# Patient Record
Sex: MALE | Race: WHITE
[De-identification: names, ages, dates, MRNs, and addresses within clinical notes are randomized per-mention and may not be internally consistent; named-entity substitution may affect disease eponyms.]

---

## 2017-02-13 ENCOUNTER — HOSPITAL ENCOUNTER (OUTPATIENT)
Dept: HOSPITAL 47 - BARWHC3 | Age: 57
Discharge: HOME | End: 2017-02-13
Attending: SURGERY
Payer: COMMERCIAL

## 2017-02-13 DIAGNOSIS — E66.01: ICD-10-CM

## 2017-02-13 DIAGNOSIS — Z01.818: Primary | ICD-10-CM

## 2017-02-13 DIAGNOSIS — Z99.89: ICD-10-CM

## 2017-02-13 DIAGNOSIS — Z87.891: ICD-10-CM

## 2017-02-13 DIAGNOSIS — G47.33: ICD-10-CM

## 2017-02-13 PROCEDURE — 99211 OFF/OP EST MAY X REQ PHY/QHP: CPT

## 2017-02-13 NOTE — P.HPBAR
Bariatric H&P





- History & Physicial


H&P Date: 02/13/17


History & Physicial: 


Visit/CC: 





Patient initial contact: 





Initial weight: 


Initial weight in pounds: 


Height: 


Initial BMI: 





Last weight: 





Current weight: 


Current weight in pounds: 


Current BMI: 





Ideal body weight (based on NIH guidelines): 





Excess body weight loss: 








The patient is a 56 year-old M who presents for Bariatric Assessment.  Patient 

presents for sleeve follow-up consultation.  He has recently undergone 

psychological evaluation and has obtained a letter from his primary care 

doctor.  The patient is upset regarding his psychologist.  Apparently Yonny Kolb has pain in his psychologist however he is demanding another $250 for his 

letter.














Past Medical History


Past Medical History: Eye Disorder, GERD/Reflux, Hypertension, Musculoskeletal 

Disorder, Osteoarthritis (OA), Rheumatoid Arthritis (RA), Sleep Apnea/CPAP/BIPAP


Additional Past Medical History / Comment(s): EDEMA TO RUFUS EXTREMITIES, 

CELLULITIS, WRAPS USED, ON RX. GLAUCOMA.  Stomach Ulcer caused infection to 

Liver, Kidneys 1/15/2016.  Hx Bowel obst-self resolved.  Chronic back pain due 

to stenosis.  USES CPAP.


History of Any Multi-Drug Resistant Organisms: None Reported


Past Surgical History: Hernia Repair, Tonsillectomy


Additional Past Surgical History / Comment(s): C6 Spinal Fusion.  Hiatal Hernia 

Repair.  Drainage of Liver Abcess.  EGD, COLONOSCOPY.


Past Anesthesia/Blood Transfusion Reactions: No Reported Reaction


Additional Past Anesthesia/Blood Transfusion Reaction / Comm: hx no blood 

transfusion


Past Psychological History: No Psychological Hx Reported


Smoking Status: Former smoker


Past Alcohol Use History: None Reported


Additional Past Alcohol Use History / Comment(s): Quit Smoking 2009, Started 

Smoking 1989, Smoked 1ppd


Past Drug Use History: None Reported





- Past Family History


  ** Mother


Family Medical History: No Reported History





  ** Father


Additional Family Medical History / Comment(s): heart problems-living at age 83





Surgical - Exam





- General


well developed, no distress





- Eyes


PERRL





- ENT


normal pinna





- Neck


no masses





- Respiratory


normal expansion





- Cardiovascular


Rhythm: regular





- Abdomen


Abdomen: soft, non tender





Bariatric Assessment & Plan


Plan: 





Morbid obesity.  Patient has a good understanding of sleeve gastrectomy.  MThe 

patient is aware of the risk of surgery.  The patient is a good understanding 

of the sleeve gastrectomy procedure.  Patient understands the risk of gastric 

staple line disruption, bleeding, perforation and obstruction.  The patient 

aware that in the event of staple line disruption or perforation a surgical 

drain will need to be placed and TPN will be used for nutrition.





The patient will be scheduled for sleeve gastrectomy hopefully next month.





Bariatric Checklist


Checklist: 


Plan: 





Checklist: 





EGD: 


1. Hiatal hernia: 


2. H. Pylori: 





HgbA1c: 





Vitamin D: 





Smoking: Former smoker





Primary care physician referral: Dr. Cid





Psychiatry clearance: 





Cardiology clearance: 





Sleep study: 





Diet journal: 





VTE risk score: 





VTE risk level: 





Rehab needs at discharge:

## 2017-02-20 ENCOUNTER — HOSPITAL ENCOUNTER (OUTPATIENT)
Dept: HOSPITAL 47 - BARWHC3 | Age: 57
Discharge: HOME | End: 2017-02-20
Attending: SURGERY
Payer: COMMERCIAL

## 2017-02-20 VITALS — BODY MASS INDEX: 44.7 KG/M2

## 2017-02-20 DIAGNOSIS — Z71.3: Primary | ICD-10-CM

## 2017-02-20 DIAGNOSIS — E66.01: ICD-10-CM

## 2017-02-20 PROCEDURE — 97804 MEDICAL NUTRITION GROUP: CPT

## 2017-03-24 ENCOUNTER — HOSPITAL ENCOUNTER (INPATIENT)
Dept: HOSPITAL 47 - 2ORMAIN | Age: 57
LOS: 3 days | Discharge: HOME | DRG: 620 | End: 2017-03-27
Attending: SURGERY | Admitting: SURGERY
Payer: COMMERCIAL

## 2017-03-24 VITALS — BODY MASS INDEX: 43.4 KG/M2

## 2017-03-24 DIAGNOSIS — G47.33: ICD-10-CM

## 2017-03-24 DIAGNOSIS — Z87.891: ICD-10-CM

## 2017-03-24 DIAGNOSIS — K56.7: ICD-10-CM

## 2017-03-24 DIAGNOSIS — Z79.899: ICD-10-CM

## 2017-03-24 DIAGNOSIS — E11.22: ICD-10-CM

## 2017-03-24 DIAGNOSIS — E83.42: ICD-10-CM

## 2017-03-24 DIAGNOSIS — E66.01: Primary | ICD-10-CM

## 2017-03-24 DIAGNOSIS — Z82.49: ICD-10-CM

## 2017-03-24 DIAGNOSIS — Z98.1: ICD-10-CM

## 2017-03-24 DIAGNOSIS — I12.9: ICD-10-CM

## 2017-03-24 DIAGNOSIS — M51.36: ICD-10-CM

## 2017-03-24 DIAGNOSIS — K66.0: ICD-10-CM

## 2017-03-24 DIAGNOSIS — N18.9: ICD-10-CM

## 2017-03-24 DIAGNOSIS — E78.5: ICD-10-CM

## 2017-03-24 DIAGNOSIS — M06.9: ICD-10-CM

## 2017-03-24 DIAGNOSIS — M19.90: ICD-10-CM

## 2017-03-24 DIAGNOSIS — K21.9: ICD-10-CM

## 2017-03-24 DIAGNOSIS — H40.9: ICD-10-CM

## 2017-03-24 DIAGNOSIS — G89.29: ICD-10-CM

## 2017-03-24 PROCEDURE — 94640 AIRWAY INHALATION TREATMENT: CPT

## 2017-03-24 PROCEDURE — 84520 ASSAY OF UREA NITROGEN: CPT

## 2017-03-24 PROCEDURE — 83735 ASSAY OF MAGNESIUM: CPT

## 2017-03-24 PROCEDURE — 0DB64Z3 EXCISION OF STOMACH, PERCUTANEOUS ENDOSCOPIC APPROACH, VERTICAL: ICD-10-PCS

## 2017-03-24 PROCEDURE — 85025 COMPLETE CBC W/AUTO DIFF WBC: CPT

## 2017-03-24 PROCEDURE — 88307 TISSUE EXAM BY PATHOLOGIST: CPT

## 2017-03-24 PROCEDURE — 74240 X-RAY XM UPR GI TRC 1CNTRST: CPT

## 2017-03-24 PROCEDURE — 82565 ASSAY OF CREATININE: CPT

## 2017-03-24 PROCEDURE — 80051 ELECTROLYTE PANEL: CPT

## 2017-03-24 PROCEDURE — 82310 ASSAY OF CALCIUM: CPT

## 2017-03-24 PROCEDURE — 84100 ASSAY OF PHOSPHORUS: CPT

## 2017-03-24 PROCEDURE — 0DNW4ZZ RELEASE PERITONEUM, PERCUTANEOUS ENDOSCOPIC APPROACH: ICD-10-PCS

## 2017-03-24 PROCEDURE — 92950 HEART/LUNG RESUSCITATION CPR: CPT

## 2017-03-24 PROCEDURE — 94760 N-INVAS EAR/PLS OXIMETRY 1: CPT

## 2017-03-24 RX ADMIN — ISODIUM CHLORIDE SCH: 0.03 SOLUTION RESPIRATORY (INHALATION) at 19:33

## 2017-03-24 RX ADMIN — KETOROLAC TROMETHAMINE SCH MG: 30 INJECTION, SOLUTION INTRAMUSCULAR at 20:16

## 2017-03-24 RX ADMIN — HYDROMORPHONE HYDROCHLORIDE PRN MG: 1 INJECTION, SOLUTION INTRAMUSCULAR; INTRAVENOUS; SUBCUTANEOUS at 17:53

## 2017-03-24 RX ADMIN — THIAMINE HYDROCHLORIDE SCH MLS/HR: 100 INJECTION, SOLUTION INTRAMUSCULAR; INTRAVENOUS at 19:18

## 2017-03-24 RX ADMIN — HYDROMORPHONE HYDROCHLORIDE PRN MG: 1 INJECTION, SOLUTION INTRAMUSCULAR; INTRAVENOUS; SUBCUTANEOUS at 21:05

## 2017-03-24 RX ADMIN — ONDANSETRON PRN MG: 2 INJECTION INTRAMUSCULAR; INTRAVENOUS at 19:53

## 2017-03-24 RX ADMIN — HYDROMORPHONE HYDROCHLORIDE PRN MG: 1 INJECTION, SOLUTION INTRAMUSCULAR; INTRAVENOUS; SUBCUTANEOUS at 17:58

## 2017-03-24 RX ADMIN — AMPICILLIN SODIUM AND SULBACTAM SODIUM SCH MLS/HR: 2; 1 INJECTION, POWDER, FOR SOLUTION INTRAMUSCULAR; INTRAVENOUS at 20:15

## 2017-03-24 RX ADMIN — HYDROMORPHONE HYDROCHLORIDE PRN MG: 1 INJECTION, SOLUTION INTRAMUSCULAR; INTRAVENOUS; SUBCUTANEOUS at 17:48

## 2017-03-24 RX ADMIN — POTASSIUM CHLORIDE SCH MLS: 14.9 INJECTION, SOLUTION INTRAVENOUS at 14:16

## 2017-03-24 RX ADMIN — HYDROMORPHONE HYDROCHLORIDE PRN MG: 1 INJECTION, SOLUTION INTRAMUSCULAR; INTRAVENOUS; SUBCUTANEOUS at 21:07

## 2017-03-24 NOTE — P.OP
Date of Procedure: 03/24/17


Preoperative Diagnosis: 


Morbid obesity





BMI 44


Postoperative Diagnosis: 


Morbid obesity





BMI 44


Procedure(s) Performed: 


Laparoscopic sleeve gastrectomy





Laparoscopic lysis of adhesions


Anesthesia: LETITIA


Surgeon: Kam Betancourt


Estimated Blood Loss (ml): 15


Pathology: other (Gastric sleeve)


Condition: stable


Disposition: PACU


Description of Procedure: 


The patient was placed on the operating room table in the supine position.  She 

received general anesthesia and then was placed in dorsal lithotomy position.  

Her abdomen was prepped and draped in sterile fashion.  The skin incision sites 

were anesthetized 1% local Xylocaine.  And then the skin was incised with an 11 

blade in the left lateral position.  Using a blade less trocar under direct 

visualization the peritoneal cavity was entered.  The abdomen was insufflated 

and then a 5 mm laparoscope was placed into the peritoneal cavity.  A 5 mm 

trocar was placed in the right epigastric, and right lateral position.  A 15 mm 

trocar was placed in the supra-umbilical position and another 5 mm trocar was 

placed in the left lateral position.  The left lateral lobe of the liver was 

retracted.  The stomach was visualized.  There were extensive adhesions between 

the antrum and stomach and liver.  This was thought to be due to the patient's 

previous gastric ulcer.  The adhesions were lysed using sharp dissection and 

the Harmonic scissors.  Approximately 20 minutes of operative time used to lyse 

adhesions.  The greater curvature of the stomach was then dissected using the 

Harmonic scissors.  The dissection occurred approximately 5 cm from the pylorus 

to the level of the left koby.  There was no hiatal hernia seen.  At this point 

a 40-[place] bougie dilator was placed the oropharynx and passed into the 

esophagus and into the stomach by the CRNA.  The sleeve gastrectomy was 

performed by using the powered echelon stapler with a seam guard buttress 

material.  Sequential firings of the stapler were performed.  The gastric 

remnant was then brought out through the 15 mm trocar site.  The dilator was 

withdrawn.  And a orogastric tube was replaced into the stomach.  The stomach 

was insufflated with 200 mL of methylene blue normal saline.  There was no 

evidence of extravasation.  The abdomen was irrigated there is no bleeding 

seen.  The Hakeem-Leanne device was used to close the 15 mm trocar with 0 

Vicryl.  Skin was closed with interrupted 3-0 Monocryl sutures once the 

trochars withdrawn.  Dermabond dressing was applied.  Patient was sent to 

recovery in stable condition.

## 2017-03-24 NOTE — P.GSHP
History of Present Illness


H&P Date: 03/24/17


Chief Complaint: Morbid obesity





This a 56-year-old male who presents today for laparoscopic sleeve gastrectomy.

  Patient has had lifetime problems obesity.  His BMI is 43.  The patient is 

aware of the risk of surgery including conversion to the open procedure and 

injury to the stomach liver or spleen.  He is also a risk of possible gastric 

staple line disruption, bleeding or scarring.





- Constitutional


Constitutional: Reports as per HPI





Past Medical History


Past Medical History: Eye Disorder, GERD/Reflux, Hypertension, Musculoskeletal 

Disorder, Osteoarthritis (OA), Rheumatoid Arthritis (RA), Sleep Apnea/CPAP/BIPAP


Additional Past Medical History / Comment(s): Frequent EDEMA TO RUFUS EXTREMITIES-

none currently, HX CELLULITIS,GLAUCOMA.  Stomach Ulcer caused infection to Liver

-ended up w/sepsis 2016, affected kidney function @ that time but fine now,  Hx 

Bowel obst-self resolved.  Chronic back pain due to stenosis.  USES CPAP.


History of Any Multi-Drug Resistant Organisms: None Reported


Past Surgical History: Hernia Repair, Tonsillectomy


Additional Past Surgical History / Comment(s): C6 Spinal Fusion.  Hiatal Hernia 

Repair.  Drainage of Liver Abcess.  EGD, COLONOSCOPY.


Past Anesthesia/Blood Transfusion Reactions: No Reported Reaction


Additional Past Anesthesia/Blood Transfusion Reaction / Comment(s): hx no blood 

transfusion


Past Psychological History: No Psychological Hx Reported


Smoking Status: Former smoker


Past Alcohol Use History: None Reported


Additional Past Alcohol Use History / Comment(s): Quit Smoking 2009, Started 

Smoking 1989, Smoked 1ppd


Past Drug Use History: None Reported





- Past Family History


  ** Mother


Family Medical History: No Reported History





  ** Father


Additional Family Medical History / Comment(s): heart problems-living at age 83





Medications and Allergies


 Home Medications











 Medication  Instructions  Recorded  Confirmed  Type


 


Metoprolol Tartrate [Lopressor] 50 mg PO BID 02/23/16 03/24/17 History


 


hydrALAZINE HCL [Apresoline] 25 mg PO BID 02/23/16 03/24/17 History


 


Cyclobenzaprine [Flexeril] 5 mg PO DAILY PRN 06/22/16 03/24/17 History


 


Gabapentin [Neurontin] 300 mg PO QID 06/22/16 03/24/17 History


 


Indomethacin [Indocin] 50 mg PO BID 06/22/16 03/24/17 History


 


Multivitamin [Men's Multi-Vitamin] 500 mg PO DAILY 06/22/16 03/24/17 History


 


Omeprazole 40 mg PO DAILY 06/22/16 03/24/17 History


 


Potassium Chloride [Klor-Con] 20 meq PO TID 06/22/16 03/24/17 History


 


Suvorexant [Belsomra] 20 mg PO HS 06/22/16 03/24/17 History


 


Bumetanide [Bumex] 1 mg PO BID 08/22/16 03/24/17 History


 


Magnesium Oxide [Magox 400] 500 mg PO DAILY 08/22/16 03/24/17 History


 


Allopurinol [Zyloprim] 300 mg PO DAILY 10/17/16 03/24/17 History


 


Metolazone [Zaroxolyn] 5 mg PO DAILY 10/17/16 03/24/17 History


 


Calcitriol [Rocaltrol] 0.25 mcg PO TID 03/20/17 03/24/17 History


 


Cranberry Fruit Concentrate 4,200 mg PO DAILY 03/20/17 03/24/17 History





[Cranberry]    


 


Docusate [Colace] 100 mg PO DAILY 03/20/17 03/24/17 History


 


HYDROcodone/APAP 10-325MG [Norco 1 tab PO Q4H PRN 03/20/17 03/24/17 History





]    


 


Modafinil [Provigil] 200 mg PO BID 03/20/17 03/24/17 History


 


Phenylephrine HCl [4 Way] 1 spray EA NOSTRIL DAILY PRN 03/20/17 03/24/17 History


 


Sodium Chloride [Ayr] 1 spray EA NOSTRIL DAILY 03/20/17 03/24/17 History











 Allergies











Allergy/AdvReac Type Severity Reaction Status Date / Time


 


No Known Allergies Allergy   Verified 03/24/17 13:45














Surgical - Exam


 Vital Signs











Temp Pulse Resp BP


 


 98.5 F   89   16   103/63 


 


 03/24/17 13:34  03/24/17 13:34  03/24/17 13:34  03/24/17 13:34














- General


well developed, no distress





- Eyes


PERRL





- ENT


normal pinna





- Neck


no masses





- Respiratory


normal expansion





- Cardiovascular


Rhythm: regular





- Abdomen


Abdomen: soft, non tender





Assessment and Plan


Plan: 





Morbid obesity.  Patient has a previous history of perforated gastric ulcer.  I 

discussed the patient's family that he is at risk of adhesions..  QUESTIONS 

have been answered.  Dr. Gonzalez be consult for medical management during his 

hospitalization.

## 2017-03-25 LAB
ANION GAP SERPL CALC-SCNC: 14 MMOL/L
BASOPHILS # BLD AUTO: 0 K/UL (ref 0–0.2)
BASOPHILS NFR BLD AUTO: 0 %
BUN SERPL-SCNC: 47 MG/DL (ref 9–20)
CALCIUM SPEC-MCNC: 9 MG/DL (ref 8.4–10.2)
CH: 31.6
CHCM: 34.9
CHLORIDE SERPL-SCNC: 97 MMOL/L (ref 98–107)
CO2 SERPL-SCNC: 30 MMOL/L (ref 22–30)
EOSINOPHIL # BLD AUTO: 0 K/UL (ref 0–0.7)
EOSINOPHIL NFR BLD AUTO: 0 %
ERYTHROCYTE [DISTWIDTH] IN BLOOD BY AUTOMATED COUNT: 4.96 M/UL (ref 4.3–5.9)
ERYTHROCYTE [DISTWIDTH] IN BLOOD: 14.3 % (ref 11.5–15.5)
HCT VFR BLD AUTO: 45.2 % (ref 39–53)
HDW: 3.17
HGB BLD-MCNC: 15.4 GM/DL (ref 13–17.5)
LUC NFR BLD AUTO: 3 %
LYMPHOCYTES # SPEC AUTO: 1.2 K/UL (ref 1–4.8)
LYMPHOCYTES NFR SPEC AUTO: 9 %
MAGNESIUM SPEC-SCNC: 2.4 MG/DL (ref 1.6–2.3)
MCH RBC QN AUTO: 31 PG (ref 25–35)
MCHC RBC AUTO-ENTMCNC: 34.1 G/DL (ref 31–37)
MCV RBC AUTO: 91 FL (ref 80–100)
MONOCYTES # BLD AUTO: 0.8 K/UL (ref 0–1)
MONOCYTES NFR BLD AUTO: 7 %
NEUTROPHILS # BLD AUTO: 9.9 K/UL (ref 1.3–7.7)
NEUTROPHILS NFR BLD AUTO: 80 %
NON-AFRICAN AMERICAN GFR(MDRD): >60
PHOSPHATE SERPL-MCNC: 4.5 MG/DL (ref 2.5–4.5)
POTASSIUM SERPL-SCNC: 3.8 MMOL/L (ref 3.5–5.1)
SODIUM SERPL-SCNC: 141 MMOL/L (ref 137–145)
WBC # BLD AUTO: 0.37 10*3/UL
WBC # BLD AUTO: 12.3 K/UL (ref 3.8–10.6)
WBC (PEROX): 12.44

## 2017-03-25 RX ADMIN — THIAMINE HYDROCHLORIDE SCH MLS/HR: 100 INJECTION, SOLUTION INTRAMUSCULAR; INTRAVENOUS at 00:12

## 2017-03-25 RX ADMIN — ONDANSETRON PRN MG: 2 INJECTION INTRAMUSCULAR; INTRAVENOUS at 00:20

## 2017-03-25 RX ADMIN — METOCLOPRAMIDE PRN MG: 5 INJECTION, SOLUTION INTRAMUSCULAR; INTRAVENOUS at 14:59

## 2017-03-25 RX ADMIN — KETOROLAC TROMETHAMINE SCH MG: 30 INJECTION, SOLUTION INTRAMUSCULAR at 05:19

## 2017-03-25 RX ADMIN — ISODIUM CHLORIDE SCH MG: 0.03 SOLUTION RESPIRATORY (INHALATION) at 08:31

## 2017-03-25 RX ADMIN — THIAMINE HYDROCHLORIDE SCH MLS/HR: 100 INJECTION, SOLUTION INTRAMUSCULAR; INTRAVENOUS at 20:53

## 2017-03-25 RX ADMIN — ENOXAPARIN SODIUM SCH MG: 40 INJECTION SUBCUTANEOUS at 20:54

## 2017-03-25 RX ADMIN — KETOROLAC TROMETHAMINE SCH MG: 30 INJECTION, SOLUTION INTRAMUSCULAR at 11:32

## 2017-03-25 RX ADMIN — MORPHINE SULFATE PRN MG: 2 INJECTION, SOLUTION INTRAMUSCULAR; INTRAVENOUS at 14:58

## 2017-03-25 RX ADMIN — ISODIUM CHLORIDE SCH MG: 0.03 SOLUTION RESPIRATORY (INHALATION) at 20:17

## 2017-03-25 RX ADMIN — ISODIUM CHLORIDE SCH MG: 0.03 SOLUTION RESPIRATORY (INHALATION) at 11:57

## 2017-03-25 RX ADMIN — ONDANSETRON PRN MG: 2 INJECTION INTRAMUSCULAR; INTRAVENOUS at 07:34

## 2017-03-25 RX ADMIN — AMPICILLIN SODIUM AND SULBACTAM SODIUM SCH MLS/HR: 2; 1 INJECTION, POWDER, FOR SOLUTION INTRAMUSCULAR; INTRAVENOUS at 00:12

## 2017-03-25 RX ADMIN — ONDANSETRON PRN MG: 2 INJECTION INTRAMUSCULAR; INTRAVENOUS at 14:59

## 2017-03-25 RX ADMIN — ISODIUM CHLORIDE SCH: 0.03 SOLUTION RESPIRATORY (INHALATION) at 16:51

## 2017-03-25 RX ADMIN — MORPHINE SULFATE PRN MG: 2 INJECTION, SOLUTION INTRAMUSCULAR; INTRAVENOUS at 22:05

## 2017-03-25 RX ADMIN — KETOROLAC TROMETHAMINE SCH MG: 30 INJECTION, SOLUTION INTRAMUSCULAR at 23:32

## 2017-03-25 RX ADMIN — HYDROMORPHONE HYDROCHLORIDE PRN MG: 1 INJECTION, SOLUTION INTRAMUSCULAR; INTRAVENOUS; SUBCUTANEOUS at 00:12

## 2017-03-25 RX ADMIN — THIAMINE HYDROCHLORIDE SCH MLS/HR: 100 INJECTION, SOLUTION INTRAMUSCULAR; INTRAVENOUS at 07:34

## 2017-03-25 RX ADMIN — POTASSIUM CHLORIDE SCH: 14.9 INJECTION, SOLUTION INTRAVENOUS at 05:29

## 2017-03-25 RX ADMIN — PANTOPRAZOLE SODIUM SCH MG: 40 INJECTION, POWDER, FOR SOLUTION INTRAVENOUS at 10:10

## 2017-03-25 RX ADMIN — KETOROLAC TROMETHAMINE SCH MG: 30 INJECTION, SOLUTION INTRAMUSCULAR at 00:11

## 2017-03-25 RX ADMIN — METOCLOPRAMIDE PRN MG: 5 INJECTION, SOLUTION INTRAMUSCULAR; INTRAVENOUS at 07:34

## 2017-03-25 RX ADMIN — KETOROLAC TROMETHAMINE SCH MG: 30 INJECTION, SOLUTION INTRAMUSCULAR at 18:26

## 2017-03-25 RX ADMIN — MORPHINE SULFATE PRN MG: 2 INJECTION, SOLUTION INTRAMUSCULAR; INTRAVENOUS at 19:13

## 2017-03-25 RX ADMIN — ENOXAPARIN SODIUM SCH MG: 40 INJECTION SUBCUTANEOUS at 10:10

## 2017-03-25 RX ADMIN — MORPHINE SULFATE PRN MG: 2 INJECTION, SOLUTION INTRAMUSCULAR; INTRAVENOUS at 11:33

## 2017-03-25 RX ADMIN — ONDANSETRON PRN MG: 2 INJECTION INTRAMUSCULAR; INTRAVENOUS at 19:13

## 2017-03-25 NOTE — FL
EXAMINATION TYPE: FL UGI

 

DATE OF EXAM ORDERED: 3/25/2017 9:53 AM

 

HISTORY: Post Op Bariatric Surgery.

 

COMPARISON: None.

 

FINDINGS:  There is a mild delay in aggressive of barium from the esophagus into the stomach. There i
s no evidence of extravasation. There is no evidence of free air. The ligament of Treitz is in the no
rmal location.

 

IMPRESSION: 

 

STATUS POST GASTRIC SLEEVE PROCEDURE.

## 2017-03-25 NOTE — P.PN
Progress Note - Text





Patient is postop day 1 posterior gastric sleeve procedure.  Obesity.  Has much 

nausea and the some dry heaves.  States he's not been able to keep any fluids 

down.  Did have a barium swallow this morning which showed the mild delay in 

passage of contrast the through the stomach the apposition or leak was noted.





On examination the patient is awake alert in no distress.  Temperature is 

normal.  Vitals are normal.  Abdomen is obese soft.  Usual mild postoperative 

tenderness at the trocar sites.  No guarding or rebound or evidence of 

peritonitis or complication.





Impression probably mild gastric ileus with the mild delay of contrast the





Recommendation Dr. Betancourt is aware.Continued medical management and further 

recommendation per Dr. Betancourt.

## 2017-03-26 RX ADMIN — POTASSIUM CHLORIDE SCH: 20 TABLET, EXTENDED RELEASE ORAL at 12:22

## 2017-03-26 RX ADMIN — METOPROLOL TARTRATE SCH MG: 50 TABLET, FILM COATED ORAL at 12:15

## 2017-03-26 RX ADMIN — MODAFINIL SCH: 200 TABLET ORAL at 12:19

## 2017-03-26 RX ADMIN — MORPHINE SULFATE PRN MG: 2 INJECTION, SOLUTION INTRAMUSCULAR; INTRAVENOUS at 20:42

## 2017-03-26 RX ADMIN — ISODIUM CHLORIDE SCH: 0.03 SOLUTION RESPIRATORY (INHALATION) at 16:48

## 2017-03-26 RX ADMIN — MODAFINIL SCH: 200 TABLET ORAL at 17:28

## 2017-03-26 RX ADMIN — POTASSIUM CHLORIDE SCH: 20 TABLET, EXTENDED RELEASE ORAL at 20:42

## 2017-03-26 RX ADMIN — MORPHINE SULFATE PRN MG: 2 INJECTION, SOLUTION INTRAMUSCULAR; INTRAVENOUS at 15:03

## 2017-03-26 RX ADMIN — THIAMINE HYDROCHLORIDE SCH MLS/HR: 100 INJECTION, SOLUTION INTRAMUSCULAR; INTRAVENOUS at 20:39

## 2017-03-26 RX ADMIN — CALCITRIOL CAPSULES 0.25 MCG SCH MCG: 0.25 CAPSULE ORAL at 17:27

## 2017-03-26 RX ADMIN — LIDOCAINE SCH PATCH: 50 PATCH TOPICAL at 12:48

## 2017-03-26 RX ADMIN — ENOXAPARIN SODIUM SCH MG: 40 INJECTION SUBCUTANEOUS at 12:16

## 2017-03-26 RX ADMIN — HYDROCODONE BITARTRATE AND ACETAMINOPHEN PRN EACH: 10; 325 TABLET ORAL at 17:27

## 2017-03-26 RX ADMIN — POTASSIUM CHLORIDE SCH: 20 TABLET, EXTENDED RELEASE ORAL at 17:27

## 2017-03-26 RX ADMIN — GABAPENTIN SCH MG: 300 CAPSULE ORAL at 17:28

## 2017-03-26 RX ADMIN — ISODIUM CHLORIDE SCH: 0.03 SOLUTION RESPIRATORY (INHALATION) at 19:34

## 2017-03-26 RX ADMIN — Medication SCH: at 12:21

## 2017-03-26 RX ADMIN — CALCITRIOL CAPSULES 0.25 MCG SCH: 0.25 CAPSULE ORAL at 12:21

## 2017-03-26 RX ADMIN — THIAMINE HYDROCHLORIDE SCH MLS/HR: 100 INJECTION, SOLUTION INTRAMUSCULAR; INTRAVENOUS at 09:12

## 2017-03-26 RX ADMIN — MORPHINE SULFATE PRN MG: 2 INJECTION, SOLUTION INTRAMUSCULAR; INTRAVENOUS at 11:10

## 2017-03-26 RX ADMIN — THERA TABS SCH: TAB at 12:20

## 2017-03-26 RX ADMIN — CALCITRIOL CAPSULES 0.25 MCG SCH MCG: 0.25 CAPSULE ORAL at 20:41

## 2017-03-26 RX ADMIN — BUMETANIDE SCH MG: 1 TABLET ORAL at 12:18

## 2017-03-26 RX ADMIN — KETOROLAC TROMETHAMINE SCH MG: 30 INJECTION, SOLUTION INTRAMUSCULAR at 04:54

## 2017-03-26 RX ADMIN — GABAPENTIN SCH: 300 CAPSULE ORAL at 12:30

## 2017-03-26 RX ADMIN — GABAPENTIN SCH MG: 300 CAPSULE ORAL at 20:41

## 2017-03-26 RX ADMIN — ISODIUM CHLORIDE SCH MG: 0.03 SOLUTION RESPIRATORY (INHALATION) at 11:29

## 2017-03-26 RX ADMIN — BUMETANIDE SCH MG: 1 TABLET ORAL at 20:41

## 2017-03-26 RX ADMIN — ENOXAPARIN SODIUM SCH MG: 40 INJECTION SUBCUTANEOUS at 20:41

## 2017-03-26 RX ADMIN — THIAMINE HYDROCHLORIDE SCH MLS/HR: 100 INJECTION, SOLUTION INTRAMUSCULAR; INTRAVENOUS at 04:53

## 2017-03-26 RX ADMIN — METOPROLOL TARTRATE SCH MG: 50 TABLET, FILM COATED ORAL at 20:41

## 2017-03-26 RX ADMIN — PANTOPRAZOLE SODIUM SCH MG: 40 INJECTION, POWDER, FOR SOLUTION INTRAVENOUS at 08:17

## 2017-03-26 RX ADMIN — METOLAZONE SCH: 5 TABLET ORAL at 12:21

## 2017-03-26 RX ADMIN — ALLOPURINOL SCH MG: 300 TABLET ORAL at 12:19

## 2017-03-26 RX ADMIN — MORPHINE SULFATE PRN MG: 2 INJECTION, SOLUTION INTRAMUSCULAR; INTRAVENOUS at 07:54

## 2017-03-26 RX ADMIN — MORPHINE SULFATE PRN MG: 2 INJECTION, SOLUTION INTRAMUSCULAR; INTRAVENOUS at 18:07

## 2017-03-26 RX ADMIN — ISODIUM CHLORIDE SCH MG: 0.03 SOLUTION RESPIRATORY (INHALATION) at 07:22

## 2017-03-26 RX ADMIN — MORPHINE SULFATE PRN MG: 2 INJECTION, SOLUTION INTRAMUSCULAR; INTRAVENOUS at 04:53

## 2017-03-26 RX ADMIN — MORPHINE SULFATE PRN MG: 2 INJECTION, SOLUTION INTRAMUSCULAR; INTRAVENOUS at 01:36

## 2017-03-26 RX ADMIN — GABAPENTIN SCH: 300 CAPSULE ORAL at 12:21

## 2017-03-26 RX ADMIN — LIDOCAINE SCH PATCH: 50 PATCH TOPICAL at 13:04

## 2017-03-26 NOTE — PN
SUBJECTIVE:  A 56-year-old white male, status post gastric sleeve, 

complaining he is shaking due to severe pain. He states I am not 

giving him his home pain medicines. He is in severe pain, it is not 

working when he is getting.  



History of lumbar disc disease. Discussed with the nurse or inpatient, 

ordering a Lidoderm patch for his lower back, increasing his morphine 

and putting him on his home pain medications, which will be done.  



CARDIOVASCULAR: S1, S2. 

LUNGS: Clear. 

GI: Incision clean, dry, intact. 



Pulse rate is in the 80s to 90s, respirations 16 to 18, O2 at 100% on 

room air.  



ASSESSMENT: 

1. Status post gastric sleeve. 

2. Hypertension. 

3. History of liver abscess. 

4. Severe lumbar disc disease.

5. Hypomagnesemia. 

6. Chronic renal insufficiency. Continue with current treatment.

## 2017-03-26 NOTE — CONS
DATE OF CONSULTATION:   



CHIEF COMPLAINT: A 56-year-old white male, status post gastric sleeve 

surgery. He is complaining of severe lower back pain, has a history of 

severe lumbar disc disease. He has a lot of gas in his chest, status 

post laparoscopic gastric sleeve. His BMI was 43.  In the past year he 

has had a liver abscess, diabetes, hypertension, obesity, 

osteoarthritis, rheumatoid arthritis, severe obstructive sleep apnea. 

He has frequent edema to the lower extremities.  



History of cellulitis or glaucoma. Renal function, lumbar disc 

disease, uses CPAP, hernia repair, tonsillectomy, EGD, colonoscopy, C6 

cervical spinal fusions, colonoscopies.  



SOCIAL HISTORY: Former smoker. No alcohol or smoking since 2009. 



FAMILY HISTORY: Father had heart disease. 



MEDICATIONS:

1. Lopressor.

2. Apresoline.

3. Flexeril.

4. Neurontin.

5. Indocin. 

6. Omeprazole.

7. Klor-Con. 

8. Belsomra. 

9. Bumex.

10. Mag-Oxide.

11. Zyloprim.

12. Zaroxolyn. 

13. Glucotrol.

14. Colace.

15. Norco. 

16. Provigil. 



Allergies are negative. 



Temp 98.5, pulse 89, respiratory rate 16 to 18, blood pressure 103/63. 

CARDIOVASCULAR: S1, S2. 

LUNGS: Transmitted upper airway sounds. 

PSYCH: Fair mood and affect. 

NEUROLOGIC: Alert and oriented x3. 

GI: Soft, nontender, normal bowel sounds, morbid obesity. 

HEMATOLOGIC: Negative Homans. 

VASCULAR: Normal dorsalis pedis, posterior tibial and radial pulse. 

OPHTHALMOLOGIC: Pupils equal, round and reactive to light and 

accommodation. 



ASSESSMENT:

1. Status post gastric sleeve.

2. Diabetes mellitus.

3. Hypertension.

4. Sleep apnea. 

5. Obesity. 

6. Dyslipidemia. 



Continue current treatments for the next 24 to 48 hours. Home 

medicines will be reordered. Morphine will be given, Dilaudid will be 

discontinued as Dilaudid causes severe nausea. Zofran will be given 

for nausea.

## 2017-03-26 NOTE — P.PN
Progress Note - Text





The patient is very unhappy.  He complained mostly of back pain.  Had been on 

no quite home fluid.  Currently getting multiple pain medications including 

Dilaudid and local the it does not seem to be a covering his pain.  He is 

tolerating his liquids now with no further vomiting.  On examination the 

patient is awake alert afebrile vitals are stable.  Abdomen usual postoperative 

tenderness but quite soft.  Trocar sites are fine.





Impression improved flow GI function status post gastric sleeve procedure.  

Chronic back pain worse with the postop surgery.





Recommendation try tramadol for a few doses today ,suspect he should be able to 

be discharged tomorrow.

## 2017-03-27 VITALS
RESPIRATION RATE: 16 BRPM | HEART RATE: 86 BPM | DIASTOLIC BLOOD PRESSURE: 73 MMHG | SYSTOLIC BLOOD PRESSURE: 122 MMHG | TEMPERATURE: 99.2 F

## 2017-03-27 RX ADMIN — ISODIUM CHLORIDE SCH: 0.03 SOLUTION RESPIRATORY (INHALATION) at 07:30

## 2017-03-27 RX ADMIN — HYDROCODONE BITARTRATE AND ACETAMINOPHEN PRN EACH: 10; 325 TABLET ORAL at 00:07

## 2017-03-27 RX ADMIN — GABAPENTIN SCH MG: 300 CAPSULE ORAL at 07:55

## 2017-03-27 RX ADMIN — THERA TABS SCH: TAB at 07:59

## 2017-03-27 RX ADMIN — POTASSIUM CHLORIDE SCH: 20 TABLET, EXTENDED RELEASE ORAL at 08:00

## 2017-03-27 RX ADMIN — Medication SCH: at 07:58

## 2017-03-27 RX ADMIN — LIDOCAINE SCH PATCH: 50 PATCH TOPICAL at 07:59

## 2017-03-27 RX ADMIN — CALCITRIOL CAPSULES 0.25 MCG SCH MCG: 0.25 CAPSULE ORAL at 07:55

## 2017-03-27 RX ADMIN — GABAPENTIN SCH: 300 CAPSULE ORAL at 12:21

## 2017-03-27 RX ADMIN — THIAMINE HYDROCHLORIDE SCH MLS/HR: 100 INJECTION, SOLUTION INTRAMUSCULAR; INTRAVENOUS at 02:54

## 2017-03-27 RX ADMIN — THIAMINE HYDROCHLORIDE SCH: 100 INJECTION, SOLUTION INTRAMUSCULAR; INTRAVENOUS at 02:01

## 2017-03-27 RX ADMIN — THIAMINE HYDROCHLORIDE SCH MLS/HR: 100 INJECTION, SOLUTION INTRAMUSCULAR; INTRAVENOUS at 08:18

## 2017-03-27 RX ADMIN — LIDOCAINE SCH PATCH: 50 PATCH TOPICAL at 07:56

## 2017-03-27 RX ADMIN — ALLOPURINOL SCH MG: 300 TABLET ORAL at 07:56

## 2017-03-27 RX ADMIN — MORPHINE SULFATE PRN MG: 2 INJECTION, SOLUTION INTRAMUSCULAR; INTRAVENOUS at 06:07

## 2017-03-27 RX ADMIN — LIDOCAINE SCH PATCH: 50 PATCH TOPICAL at 07:57

## 2017-03-27 RX ADMIN — MORPHINE SULFATE PRN MG: 2 INJECTION, SOLUTION INTRAMUSCULAR; INTRAVENOUS at 02:52

## 2017-03-27 RX ADMIN — BUMETANIDE SCH MG: 1 TABLET ORAL at 07:55

## 2017-03-27 RX ADMIN — ENOXAPARIN SODIUM SCH: 40 INJECTION SUBCUTANEOUS at 07:50

## 2017-03-27 RX ADMIN — MORPHINE SULFATE PRN MG: 2 INJECTION, SOLUTION INTRAMUSCULAR; INTRAVENOUS at 00:08

## 2017-03-27 RX ADMIN — ISODIUM CHLORIDE SCH MG: 0.03 SOLUTION RESPIRATORY (INHALATION) at 07:26

## 2017-03-27 RX ADMIN — MORPHINE SULFATE PRN MG: 2 INJECTION, SOLUTION INTRAMUSCULAR; INTRAVENOUS at 09:19

## 2017-03-27 RX ADMIN — METOLAZONE SCH MG: 5 TABLET ORAL at 07:58

## 2017-03-27 RX ADMIN — METOPROLOL TARTRATE SCH MG: 50 TABLET, FILM COATED ORAL at 07:55

## 2017-03-27 RX ADMIN — ISODIUM CHLORIDE SCH: 0.03 SOLUTION RESPIRATORY (INHALATION) at 10:59

## 2017-03-27 RX ADMIN — MODAFINIL SCH: 200 TABLET ORAL at 07:54

## 2017-03-27 RX ADMIN — PANTOPRAZOLE SODIUM SCH MG: 40 INJECTION, POWDER, FOR SOLUTION INTRAVENOUS at 07:59

## 2017-03-27 RX ADMIN — HYDROCODONE BITARTRATE AND ACETAMINOPHEN PRN EACH: 10; 325 TABLET ORAL at 06:34

## 2017-03-27 NOTE — P.PN
Subjective





56-year-old being seen by medicine service at the request of the attending for 

medical management in a gentleman who is postop sleeve gastrectomy done on 

March 24 an elective basis for weight loss patient is seen sitting up in a 

chair and states he's anxious to be discharged home.  Dietitian to instruct 

patient on a bariatric diet.  Patient's denying dizziness lightheadedness chest 

pain or shortness of breath when questioning noted a low-grade temp of 99.2 

this morning no  labs pending





Objective





- Vital Signs


Vital signs: 


 Vital Signs











Temp  99.2 F   03/27/17 08:03


 


Pulse  86   03/27/17 08:03


 


Resp  16   03/27/17 08:03


 


BP  122/73   03/27/17 08:03


 


Pulse Ox  98   03/27/17 08:03








 Intake & Output











 03/26/17 03/27/17 03/27/17





 18:59 06:59 18:59


 


Intake Total 430 600 200


 


Output Total  850 


 


Balance 430 -250 200


 


Intake:   


 


  Intake, IV Titration  600 





  Amount   


 


    0.9% NaCl with KCl 20 Meq  600 





    /l 1,000 ml @ 150 mls/hr   





    IV .Q6H40M [facility] Rx#:   





    [id number]   


 


  Oral 430  200


 


Output:   


 


  Urine  850 


 


Other:   


 


  # Voids 3 2 














- Exam





Physical exam


56-year-old gentleman sitting up in a chair pleasant cooperative oriented 3


Lungs essentially clear adequate air movement


Heart S1-S2 audible and regular


Abdomen obese soft not distended surgical sites benign bowel tones present 

states urinating no difficulty in tolerating the diet


Extremities no edema noted





- Labs


CBC & Chem 7: 


 03/25/17 07:11





 03/25/17 07:11





Assessment and Plan


Plan: 





Impression


Status post laparoscopic sleeve gastrectomy for morbid obesity done on March 24


Severe lumbar disc disease


Morbid obesity BMI 43


Essential hypertension








Plan


From a medical perspective patient is felt to be medically stable and 

appropriate proceed with a discharge to home defer to the timing of the 

discharge to surgical service


Resume home meds as appropriate


Follow-up in the outpatient setting with Dr. Becker as directed


Pain control











The above dictated assessment and findings were discussed with dr askew .  

Impression and the plan of care have been dictated as directed.  Tanya Dockery 

nurse practitioner acting as a scribe for dr becker

## 2017-03-27 NOTE — P.DS
Providers


Date of admission: 


03/24/17 12:35





Expected date of discharge: 03/27/17


Attending physician: 


Kma Betancourt





Consults: 





 





03/24/17 17:16


Consult Physician Routine 


   Consulting Provider: Tom Cid Reason/Comments: Medical management


   Do you want consulting provider notified?: Yes











[facility] care [facility]: 


[facility]





[facility] Course: 


Patient is a 56-year-old white male with medical history significant for morbid 

obesity presenting to the hospital for elective laparoscopic sleeve 

gastrectomy.  Patient tolerated procedure well.  Upper GI post procedure with 

no evidence of leak or obstruction.  Post operatively, patient did have some 

acute on chronic back pain which improved during his hospital stay.  Otherwise, 

patient had an uneventful postop recovery and was felt stable for discharge to 

home with follow-up in the outpatient setting.





Discharge diagnoses:





1.  Morbid obesity status post laparoscopic sleeve gastrectomy


2.  Acute on chronic chronic lumbar disc disease.





The above impression and plan have been discussed and directed by Dr. Betancourt. 

Abisai JACOBSEN acting as scribe for Dr. Ko





Pertinent Studies: 


Upper GI series





Procedures: 


Laparoscopic sleeve gastrectomy; laparoscopic lysis of adhesions





Patient Condition at Discharge: Good





Plan - Discharge Summary


New Discharge Prescriptions: 


Ondansetron Odt [Zofran ODT] 8 mg PO Q8HR #20 tab


Sucralfate [Carafate] 1 gm PO ACHS #90 tablet


Discharge Medication List





Metoprolol Tartrate [Lopressor] 50 mg PO BID 02/23/16 [History]


hydrALAZINE HCL [Apresoline] 25 mg PO BID 02/23/16 [History]


Cyclobenzaprine [Flexeril] 5 mg PO DAILY PRN 06/22/16 [History]


Gabapentin [Neurontin] 300 mg PO QID 06/22/16 [History]


Indomethacin [Indocin] 50 mg PO BID 06/22/16 [History]


Multivitamin [Men's Multi-Vitamin] 1 tab PO DAILY 06/22/16 [History]


Omeprazole 40 mg PO DAILY 06/22/16 [History]


Potassium Chloride [Klor-Con] 20 meq PO TID 06/22/16 [History]


Suvorexant [Belsomra] 20 mg PO HS 06/22/16 [History]


Bumetanide [BUMEX] 1 mg PO BID 08/22/16 [History]


Allopurinol [Zyloprim] 300 mg PO DAILY 10/17/16 [History]


Metolazone [Zaroxolyn] 5 mg PO DAILY 10/17/16 [History]


Calcitriol [Rocaltrol] 0.25 mcg PO TID 03/20/17 [History]


Docusate [Colace] 100 mg PO DAILY 03/20/17 [History]


HYDROcodone/APAP 10-325MG [Norco ] 1 tab PO Q4H PRN 03/20/17 [History]


Modafinil [Provigil] 200 mg PO BID 03/20/17 [History]


Phenylephrine HCl [4 Way] 1 spray EA NOSTRIL DAILY PRN 03/20/17 [History]


Sodium Chloride [Ayr] 1 spray EA NOSTRIL DAILY 03/20/17 [History]


Cranberry   4,200 mg PO DAILY 03/24/17 [History]


Magnesium Oxide [Mag-Ox] 500 mg PO DAILY 03/24/17 [History]


Albuterol Nebulized [Ventolin Nebulized] 2.5 mg INHALATION RT-QID  nebu 03/27/ 17 [Rx]


Ondansetron Odt [Zofran ODT] 8 mg PO Q8HR #20 tab 03/27/17 [Rx]


Sucralfate [Carafate] 1 gm PO ACHS #90 tablet 03/27/17 [Rx]








Follow up Appointment(s)/Referral(s): 


Tom Cid MD [Primary Care Provider] - 04/03/17 10:15 am


Kam Betancourt MD [STAFF PHYSICIAN] - 04/10/17 2:20 pm (Bariatric center [phone] located at the [facility])


Patient Instructions/Handouts:  Laparoscopic Sleeve Gastrectomy (DC)


Activity/Diet/Wound Care/Special Instructions: 


No heavy lifting, pushing, or pulling items greater than 10 pounds. 


Bariatric diet as previously directed.  No caffeinated beverages or straws.


Shower daily,  no soaking in bath tubs, pools, or hot tubs.  


No driving while taking pain medication.


Notify surgeon with any signs or symptoms of infection, increased pain, or not 

tolerating diet. 





Discharge Disposition: HOME SELF-CARE

## 2017-04-10 ENCOUNTER — HOSPITAL ENCOUNTER (OUTPATIENT)
Dept: HOSPITAL 47 - BARWHC3 | Age: 57
Discharge: HOME | End: 2017-04-10
Attending: SURGERY
Payer: COMMERCIAL

## 2017-04-10 VITALS — HEART RATE: 81 BPM | SYSTOLIC BLOOD PRESSURE: 143 MMHG | DIASTOLIC BLOOD PRESSURE: 85 MMHG | TEMPERATURE: 98.6 F

## 2017-04-10 VITALS — BODY MASS INDEX: 38.2 KG/M2

## 2017-04-10 DIAGNOSIS — Z98.84: ICD-10-CM

## 2017-04-10 DIAGNOSIS — E66.01: ICD-10-CM

## 2017-04-10 DIAGNOSIS — Z48.815: Primary | ICD-10-CM

## 2017-04-10 DIAGNOSIS — Z87.891: ICD-10-CM

## 2017-04-10 PROCEDURE — 99211 OFF/OP EST MAY X REQ PHY/QHP: CPT

## 2017-04-10 PROCEDURE — 97803 MED NUTRITION INDIV SUBSEQ: CPT

## 2017-04-10 NOTE — P.HPBAR
Bariatric H&P





- History & Physicial


H&P Date: 04/10/17


History & Physicial: 


Visit/CC: two week follow up visit





Patient initial contact: 





Initial weight: 


Initial weight in pounds: 


Height: 5 ft 10 in


Initial BMI: 





Last weight: 





Current weight: 120.837 kg


Current weight in pounds: 266.40


Current BMI: 38.2





Ideal body weight (based on NIH guidelines): 75.296 kg





Excess body weight loss: 








The patient is a 56 year-old M who presents for Bariatric Assessment.  Patient 

presents today for sleeve gastrectomy follow-up.  He is 2 weeks postoperative.  

He has complaints of back pain.














Past Medical History


Past Medical History: Eye Disorder, GERD/Reflux, Hypertension, Musculoskeletal 

Disorder, Osteoarthritis (OA), Rheumatoid Arthritis (RA), Sleep Apnea/CPAP/BIPAP


Additional Past Medical History / Comment(s): Frequent EDEMA TO RUFUS EXTREMITIES-

none currently, HX CELLULITIS,GLAUCOMA.  Stomach Ulcer caused infection to Liver

-ended up w/sepsis 2016, affected kidney function @ that time but fine now,  Hx 

Bowel obst-self resolved.  Chronic back pain due to stenosis.  USES CPAP.


History of Any Multi-Drug Resistant Organisms: None Reported


Past Surgical History: Hernia Repair, Tonsillectomy


Additional Past Surgical History / Comment(s): C6 Spinal Fusion.  Hiatal Hernia 

Repair.  Drainage of Liver Abcess.  EGD, COLONOSCOPY.


Past Anesthesia/Blood Transfusion Reactions: No Reported Reaction


Additional Past Anesthesia/Blood Transfusion Reaction / Comm: hx no blood 

transfusion


Past Psychological History: No Psychological Hx Reported


Smoking Status: Former smoker


Past Alcohol Use History: None Reported


Additional Past Alcohol Use History / Comment(s): Quit Smoking 2009, Started 

Smoking 1989, Smoked 1ppd


Past Drug Use History: None Reported





- Past Family History


  ** Mother


Family Medical History: No Reported History





  ** Father


Additional Family Medical History / Comment(s): heart problems-living at age 83





Surgical - Exam


 Vital Signs











Temp Pulse BP


 


 98.6 F   81   143/85 


 


 04/10/17 08:39  04/10/17 08:39  04/10/17 08:39














- General


well developed, no distress





- Eyes


PERRL





- ENT


normal pinna





- Neck


no masses





- Respiratory


normal expansion





- Cardiovascular


Rhythm: regular





- Abdomen


Abdomen: soft, non tender





Bariatric Assessment & Plan


Plan: 





Status post sleeve gastrectomy.  Patient is doing quite well.  He will follow-

up with Dr. Gonzalez regarding his back pain.





Bariatric Checklist


Checklist: 


Plan: 





Checklist: 





EGD: 


1. Hiatal hernia: 


2. H. Pylori: 





HgbA1c: 





Vitamin D: 





Smoking: Former smoker





Primary care physician referral: Dr. Cid





Psychiatry clearance: 





Cardiology clearance: 





Sleep study: 





Diet journal: 





VTE risk score: 





VTE risk level: 





Rehab needs at discharge:

## 2017-04-24 ENCOUNTER — HOSPITAL ENCOUNTER (OUTPATIENT)
Dept: HOSPITAL 47 - BARWHC3 | Age: 57
End: 2017-04-24
Attending: SURGERY
Payer: COMMERCIAL

## 2017-04-24 VITALS — BODY MASS INDEX: 37.3 KG/M2

## 2017-04-24 VITALS
SYSTOLIC BLOOD PRESSURE: 115 MMHG | RESPIRATION RATE: 14 BRPM | DIASTOLIC BLOOD PRESSURE: 78 MMHG | HEART RATE: 80 BPM | TEMPERATURE: 97.7 F

## 2017-04-24 DIAGNOSIS — Z98.84: ICD-10-CM

## 2017-04-24 DIAGNOSIS — K21.9: Primary | ICD-10-CM

## 2017-04-24 DIAGNOSIS — Z87.891: ICD-10-CM

## 2017-04-24 PROCEDURE — 97803 MED NUTRITION INDIV SUBSEQ: CPT

## 2017-04-24 PROCEDURE — 99211 OFF/OP EST MAY X REQ PHY/QHP: CPT

## 2017-04-24 NOTE — P.HPBAR
Bariatric H&P





- History & Physicial


H&P Date: 04/24/17


History & Physicial: 


Visit/CC: 





Patient initial contact: 





Initial weight: 


Initial weight in pounds: 


Height: 


Initial BMI: 





Last weight: 





Current weight: 


Current weight in pounds: 


Current BMI: 





Ideal body weight (based on NIH guidelines): 





Excess body weight loss: 








The patient is a 56 year-old M who presents for Bariatric Assessment.  The 

patient presents today for sleeve gastrectomy follow-up.  He is doing quite 

well.  He has lost 14 pounds since his last visit.  He has some mild GERD 

symptoms.  His back arthritis and leg arthritis is stable.














Past Medical History


Past Medical History: Eye Disorder, GERD/Reflux, Hypertension, Musculoskeletal 

Disorder, Osteoarthritis (OA), Rheumatoid Arthritis (RA), Sleep Apnea/CPAP/BIPAP


Additional Past Medical History / Comment(s): Frequent EDEMA TO RUFUS EXTREMITIES-

none currently, HX CELLULITIS,GLAUCOMA.  Stomach Ulcer caused infection to Liver

-ended up w/sepsis 2016, affected kidney function @ that time but fine now,  Hx 

Bowel obst-self resolved.  Chronic back pain due to stenosis.  USES CPAP.


History of Any Multi-Drug Resistant Organisms: None Reported


Past Surgical History: Bariatric Surgery, Hernia Repair, Tonsillectomy


Additional Past Surgical History / Comment(s): C6 Spinal Fusion.  Hiatal Hernia 

Repair.  Drainage of Liver Abcess.  EGD, COLONOSCOPY.sleeve gastrectomy 3-24-17


Past Anesthesia/Blood Transfusion Reactions: No Reported Reaction


Additional Past Anesthesia/Blood Transfusion Reaction / Comm: hx no blood 

transfusion


Past Psychological History: No Psychological Hx Reported


Smoking Status: Former smoker


Past Alcohol Use History: None Reported


Additional Past Alcohol Use History / Comment(s): Quit Smoking 2009, Started 

Smoking 1989, Smoked 1ppd


Past Drug Use History: None Reported





- Past Family History


  ** Mother


Family Medical History: No Reported History





  ** Father


Additional Family Medical History / Comment(s): heart problems-living at age 83





Surgical - Exam





- General


well developed, no distress





- Eyes


PERRL





- ENT


normal pinna





- Neck


no masses





- Respiratory


normal expansion





- Cardiovascular


Rhythm: regular





- Abdomen


Abdomen: soft, non tender





Bariatric Assessment & Plan


Plan: 





Status post sleeve gastrectomy.  Patient is doing quite well.  His GERD and 

arthritis symptoms are stable.  He'll follow-up in one month..





Bariatric Checklist


Checklist: 


Plan: 





Checklist: 





EGD: 


1. Hiatal hernia: 


2. H. Pylori: 





HgbA1c: 





Vitamin D: 





Smoking: Former smoker





Primary care physician referral: Dr. Cid





Psychiatry clearance: 





Cardiology clearance: 





Sleep study: 





Diet journal: 





VTE risk score: 





VTE risk level: 





Rehab needs at discharge:

## 2017-07-10 ENCOUNTER — HOSPITAL ENCOUNTER (OUTPATIENT)
Dept: HOSPITAL 47 - BARWHC3 | Age: 57
Discharge: HOME | End: 2017-07-10
Attending: SURGERY
Payer: COMMERCIAL

## 2017-07-10 ENCOUNTER — HOSPITAL ENCOUNTER (OUTPATIENT)
Dept: HOSPITAL 47 - LABWHC1 | Age: 57
Discharge: HOME | End: 2017-07-10
Payer: COMMERCIAL

## 2017-07-10 VITALS — SYSTOLIC BLOOD PRESSURE: 149 MMHG | TEMPERATURE: 97.7 F | DIASTOLIC BLOOD PRESSURE: 94 MMHG | HEART RATE: 82 BPM

## 2017-07-10 VITALS — BODY MASS INDEX: 35 KG/M2

## 2017-07-10 DIAGNOSIS — Z98.84: ICD-10-CM

## 2017-07-10 DIAGNOSIS — I10: ICD-10-CM

## 2017-07-10 DIAGNOSIS — Z09: Primary | ICD-10-CM

## 2017-07-10 DIAGNOSIS — Z90.49: ICD-10-CM

## 2017-07-10 DIAGNOSIS — Z87.891: ICD-10-CM

## 2017-07-10 DIAGNOSIS — R13.10: ICD-10-CM

## 2017-07-10 DIAGNOSIS — M19.90: ICD-10-CM

## 2017-07-10 DIAGNOSIS — N18.3: Primary | ICD-10-CM

## 2017-07-10 LAB
ANION GAP SERPL CALC-SCNC: 14 MMOL/L
BUN SERPL-SCNC: 14 MG/DL (ref 9–20)
CALCIUM SPEC-MCNC: 9.9 MG/DL (ref 8.4–10.2)
CHLORIDE SERPL-SCNC: 102 MMOL/L (ref 98–107)
CO2 SERPL-SCNC: 29 MMOL/L (ref 22–30)
GLUCOSE SERPL-MCNC: 86 MG/DL (ref 74–99)
NON-AFRICAN AMERICAN GFR(MDRD): >60
POTASSIUM SERPL-SCNC: 3.8 MMOL/L (ref 3.5–5.1)
SODIUM SERPL-SCNC: 145 MMOL/L (ref 137–145)

## 2017-07-10 PROCEDURE — 99211 OFF/OP EST MAY X REQ PHY/QHP: CPT

## 2017-07-10 PROCEDURE — 80048 BASIC METABOLIC PNL TOTAL CA: CPT

## 2017-07-10 PROCEDURE — 97803 MED NUTRITION INDIV SUBSEQ: CPT

## 2017-07-10 PROCEDURE — 36415 COLL VENOUS BLD VENIPUNCTURE: CPT

## 2017-07-10 PROCEDURE — 74220 X-RAY XM ESOPHAGUS 1CNTRST: CPT

## 2017-07-10 NOTE — P.HPBAR
Bariatric H&P





- History & Physicial


H&P Date: 07/10/17


History & Physicial: 


Visit/CC: follow up visit





Patient initial contact: 





Initial weight: 


Initial weight in pounds: 


Height: 5 ft 10 in


Initial BMI: 





Last weight: 260





Current weight: 110.722 kg


Current weight in pounds: 244.10


Current BMI: 35.0





Ideal body weight (based on NIH guidelines): 75.296 kg





Excess body weight loss: 








The patient is a 57 year-old M who presents for Bariatric Assessment.  Patient 

states he has some dysphagia.  He is lost 16 pounds, his last weight was 260 

pounds.  She had his gallbladder removed at [facility] 

approximately 2 weeks ago.














Review of Systems


Constitutional: Reports as per HPI





Past Medical History


Past Medical History: Eye Disorder, GERD/Reflux, Hypertension, Musculoskeletal 

Disorder, Osteoarthritis (OA), Rheumatoid Arthritis (RA), Sleep Apnea/CPAP/BIPAP


Additional Past Medical History / Comment(s): Frequent EDEMA TO RUFUS EXTREMITIES-

none currently, HX CELLULITIS,GLAUCOMA.  Stomach Ulcer caused infection to Liver

-ended up w/sepsis 2016, affected kidney function @ that time but fine now,  Hx 

Bowel obst-self resolved.  Chronic back pain due to stenosis.  USES CPAP.


History of Any Multi-Drug Resistant Organisms: None Reported


Past Surgical History: Bariatric Surgery, Hernia Repair, Tonsillectomy


Additional Past Surgical History / Comment(s): C6 Spinal Fusion.  Hiatal Hernia 

Repair.  Drainage of Liver Abcess.  EGD, COLONOSCOPY.sleeve gastrectomy 3-24-17


Past Anesthesia/Blood Transfusion Reactions: No Reported Reaction


Additional Past Anesthesia/Blood Transfusion Reaction / Comm: hx no blood 

transfusion


Past Psychological History: No Psychological Hx Reported


Smoking Status: Former smoker


Past Alcohol Use History: None Reported


Additional Past Alcohol Use History / Comment(s): Quit Smoking 2009, Started 

Smoking 1989, Smoked 1ppd


Past Drug Use History: None Reported





- Past Family History


  ** Mother


Family Medical History: No Reported History





  ** Father


Additional Family Medical History / Comment(s): heart problems-living at age 83





Surgical - Exam


 Vital Signs











Temp Pulse BP


 


 97.7 F   82   149/94 


 


 07/10/17 15:40  07/10/17 15:40  07/10/17 15:40














- General


well developed, no distress





- Eyes


PERRL





- ENT


normal pinna, normal nares





- Neck


no masses





- Respiratory


normal expansion





- Cardiovascular


Rhythm: regular





- Abdomen


Abdomen: soft, non tender





Bariatric Assessment & Plan


Plan: 





The patient is healed well from his laparoscopic cholecystectomy.  He will be 

scheduled for an esophagram today due to his mild dysphagia..  If he has any 

significant narrowing sleeve we will consider EGD.





Bariatric Checklist


Checklist: 


Plan: 





Checklist: 





EGD: 


1. Hiatal hernia: 


2. H. Pylori: 





HgbA1c: 





Vitamin D: 





Smoking: Former smoker





Primary care physician referral: Dr. Cid





Psychiatry clearance: 





Cardiology clearance: 





Sleep study: 





Diet journal: 





VTE risk score: 





VTE risk level: 





Rehab needs at discharge:

## 2017-07-10 NOTE — FL
EXAMINATION TYPE: FL barium swallow

 

DATE OF EXAM: 7/10/2017

 

LIMITED ESOPHAGRAM:

 

CLINICAL HISTORY:  Morbid Obesity, gastric sleeve surgery 5 months earlier with persistent dysphagia 
since surgery per patient. Recent cholecystectomy.

 

TECHNIQUE:  Limited esophagram is performed utilizing 20 oz of contrast ez-paque. A total of 48 secon
ds of fluoroscopic time was utilized during procedure and 13 spot images were saved.

 

Comparison: Prior upper GI study March 25, 2017

 

FINDINGS:  The patient swallowed contrast without difficulty or delay.  Esophageal peristalsis and mo
tility are within normal limits. There is good flow of contrast along the diaphragmatic hiatus into t
he proximal stomach remnant, there is new slight tortuous course and prominence with rapid filling of
 proximal sleeve and subsequent satisfactory filling through sleeve into duodenal anastomosis. No con
trast extravasation to suggest leak. New Cholecystectomy clips are evident.

 

IMPRESSION: No evidence of leak or significant obstruction status on current study.

## 2018-04-01 NOTE — HP
HISTORY AND PHYSICAL



CHIEF COMPLAINT:

Left nasal epistaxis.



HISTORY OF PRESENT ILLNESS:

The patient is a pleasant 57-year-old male who was recently seen in my office with a

complaint of having recurrent nosebleeds from the left side of his nose.  At that time,

he was was also experiencing nasal stuffiness.  The patient related to me that he had

also been using Afrin nasal spray on a chronic basis for the last 6 months to 1 year.

We advised the patient to immediately stop using this medication because of the fact

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

means that the nasal mucosa has become addicted to the Afrin nasal spray.  We advised

the patient that we will place him on a 10 day course of an oral steroid, Decadron, in

an effort to reverse this effect but he must never use Afrin nasal spray again.  At the

time that he was seen in my office,  intranasal examination revealed the patient had an

area of excoriation on the left side of the nasal septum but there was no active

bleeding.  The patient was placed on a course of Decadron Dosepak, Bactroban ointment

to be used intranasally, and finally Omnicef.  The patient was seen back in my office

approximately 2 weeks later.  Clinical examination of the left nostril revealed that

the area had not improved, that is to say, the area of excoriation was as large as it

was before and there was still active bleeding.  In addition to this, the edges around

the area of excoriation appeared to be heaped suggesting possible granulation tissue

formation.  It was recommended that he undergo control of the left epistaxis via

electrocautery under general anesthesia.  At the same time, we would biopsy some of the

tissue around this area to make sure that we are not dealing with a malignancy.



PAST MEDICAL HISTORY:

Past medical history reveals that he has no known allergies

to medications.



MEDICATIONS:

Include calcitriol, Flexeril, indomethacin, Neurontin, metoprolol.  Belsomra.

Allopurinol, oxycodone, and Prilosec.



REVIEW OF SYSTEMS:

Review of systems revealed that the cardiovascular system is positive for hypertension.

RESPIRATORY SYSTEM:  Negative.  Gastrointestinal system is positive for GERD,

gastroesophageal reflux disorder, metabolic endocrine system is positive for

hypothyroidism, musculoskeletal system is positive for fibromyalgia, gout, and

degenerative osteoarthritis.  The remainder of the review of systems is essentially

unremarkable.



PREVIOUS SURGERIES:

Include tonsillectomy and adenoidectomy, back surgery, gastric sleeve surgery,

laparoscopy, cholecystectomy, colonoscopy.



PHYSICAL EXAMINATION:

The patient is a pleasant 57-year-old male who was alert and cooperative.  HEENT

examination:  Patient is normocephalic. Tympanic membranes are normal.  Middle ear

spaces are free of any fluid or infection.  Pupils are equal, round, and reactive to

light and accommodation.  Extraocular movements within normal limits.  Intranasal

examination reveals moderate to severe septal deviation with a large area of

excoriation anteriorly on the left side of the cartilage portion of the septum.  Bare

cartilage is not exposed.  However, a significant portion of the mucous membrane has

been heaped up posteriorly.  No other suspicious lesions or areas of bleeding are

noted.  Examination of the oropharynx, palpation of the neck, cranial nerves 2 through

12 and the remainder of the head and neck exam are within normal limits.  Chest:

Cardiovascular:  Both lung fields are clear to percussion and auscultation.  The

patient is in regular sinus rhythm S1, S2 are present without evidence of murmurs, S3s

or S4.  Peripheral pulses are bilaterally symmetrical and within normal limits.

ABDOMEN: There is no evidence of any masses, megaly, or tenderness.  ABDOMEN:  Soft.

Skin is unremarkable.  Musculoskeletal and neurological within normal limits.  Rectal

examination exam is deferred at this time because the patient has this done on a

regular basis at his family physician's office.

The remainder of physical exam is essentially unremarkable.



IMPRESSION:

Severe left anterior epistaxis.



PLAN:

The patient is scheduled undergo control of severe anterior left epistaxis via

electrocautery under general anesthesia.



Attention RNs in the pre-surgical area:  I have ordered for this patient to receive

1000 mg of Ofirmev IV once an intravenous line has been established.  I have not

ordered any pre-surgical prophylactic antibiotics for this patient.  Therefore, if the

Pharmacy Department sends any pre-surgical prophylactic antibiotic to the pre-surgical

area for this patient, that order should be cancelled and the medication should be

returned to the to the pharmacy department and make sure that the patient's account is

credited appropriately.



I have discussed the risks, benefits and alternative therapies for the above-mentioned

procedure and for both sedation/analgesia as well as necessary blood product

administration, if indicated, as they pertain to this patient.  The patient has

indicated his or her understanding and acceptance of the risks and procedures

discussed.





MMODL / IJN: [id number] / Job#: [id number]

## 2018-04-02 ENCOUNTER — HOSPITAL ENCOUNTER (EMERGENCY)
Dept: HOSPITAL 47 - EC | Age: 58
Discharge: HOME | End: 2018-04-02
Payer: COMMERCIAL

## 2018-04-02 ENCOUNTER — HOSPITAL ENCOUNTER (OUTPATIENT)
Dept: HOSPITAL 47 - OR | Age: 58
Discharge: HOME | End: 2018-04-02
Attending: OTOLARYNGOLOGY
Payer: COMMERCIAL

## 2018-04-02 VITALS — SYSTOLIC BLOOD PRESSURE: 145 MMHG | DIASTOLIC BLOOD PRESSURE: 96 MMHG | HEART RATE: 63 BPM

## 2018-04-02 VITALS
TEMPERATURE: 98.2 F | DIASTOLIC BLOOD PRESSURE: 98 MMHG | SYSTOLIC BLOOD PRESSURE: 157 MMHG | RESPIRATION RATE: 18 BRPM | HEART RATE: 63 BPM

## 2018-04-02 VITALS — RESPIRATION RATE: 18 BRPM

## 2018-04-02 VITALS — BODY MASS INDEX: 31.8 KG/M2

## 2018-04-02 VITALS — TEMPERATURE: 97.2 F

## 2018-04-02 DIAGNOSIS — G47.33: ICD-10-CM

## 2018-04-02 DIAGNOSIS — Z79.899: ICD-10-CM

## 2018-04-02 DIAGNOSIS — I10: ICD-10-CM

## 2018-04-02 DIAGNOSIS — M06.9: ICD-10-CM

## 2018-04-02 DIAGNOSIS — K21.9: ICD-10-CM

## 2018-04-02 DIAGNOSIS — Z79.1: ICD-10-CM

## 2018-04-02 DIAGNOSIS — Z79.891: ICD-10-CM

## 2018-04-02 DIAGNOSIS — H40.9: ICD-10-CM

## 2018-04-02 DIAGNOSIS — M79.7: ICD-10-CM

## 2018-04-02 DIAGNOSIS — J34.0: Primary | ICD-10-CM

## 2018-04-02 DIAGNOSIS — M19.90: ICD-10-CM

## 2018-04-02 DIAGNOSIS — Z99.89: ICD-10-CM

## 2018-04-02 DIAGNOSIS — R04.0: Primary | ICD-10-CM

## 2018-04-02 DIAGNOSIS — M10.9: ICD-10-CM

## 2018-04-02 DIAGNOSIS — G47.30: ICD-10-CM

## 2018-04-02 DIAGNOSIS — Z87.891: ICD-10-CM

## 2018-04-02 DIAGNOSIS — L57.0: ICD-10-CM

## 2018-04-02 DIAGNOSIS — R04.0: ICD-10-CM

## 2018-04-02 LAB
ANION GAP SERPL CALC-SCNC: 12 MMOL/L
BASOPHILS # BLD AUTO: 0 K/UL (ref 0–0.2)
BASOPHILS NFR BLD AUTO: 0 %
BUN SERPL-SCNC: 19 MG/DL (ref 9–20)
CALCIUM SPEC-MCNC: 9.3 MG/DL (ref 8.4–10.2)
CHLORIDE SERPL-SCNC: 104 MMOL/L (ref 98–107)
CO2 SERPL-SCNC: 28 MMOL/L (ref 22–30)
EOSINOPHIL # BLD AUTO: 0.2 K/UL (ref 0–0.7)
EOSINOPHIL NFR BLD AUTO: 4 %
ERYTHROCYTE [DISTWIDTH] IN BLOOD BY AUTOMATED COUNT: 4.66 M/UL (ref 4.3–5.9)
ERYTHROCYTE [DISTWIDTH] IN BLOOD: 13.6 % (ref 11.5–15.5)
GLUCOSE SERPL-MCNC: 79 MG/DL (ref 74–99)
HCT VFR BLD AUTO: 40.3 % (ref 39–53)
HGB BLD-MCNC: 14.1 GM/DL (ref 13–17.5)
LYMPHOCYTES # SPEC AUTO: 2.2 K/UL (ref 1–4.8)
LYMPHOCYTES NFR SPEC AUTO: 39 %
MCH RBC QN AUTO: 30.3 PG (ref 25–35)
MCHC RBC AUTO-ENTMCNC: 35 G/DL (ref 31–37)
MCV RBC AUTO: 86.6 FL (ref 80–100)
MONOCYTES # BLD AUTO: 0.4 K/UL (ref 0–1)
MONOCYTES NFR BLD AUTO: 7 %
NEUTROPHILS # BLD AUTO: 2.7 K/UL (ref 1.3–7.7)
NEUTROPHILS NFR BLD AUTO: 47 %
PLATELET # BLD AUTO: 226 K/UL (ref 150–450)
POTASSIUM SERPL-SCNC: 3.7 MMOL/L (ref 3.5–5.1)
SODIUM SERPL-SCNC: 144 MMOL/L (ref 137–145)
WBC # BLD AUTO: 5.7 K/UL (ref 3.8–10.6)

## 2018-04-02 PROCEDURE — 85025 COMPLETE CBC W/AUTO DIFF WBC: CPT

## 2018-04-02 PROCEDURE — 99283 EMERGENCY DEPT VISIT LOW MDM: CPT

## 2018-04-02 PROCEDURE — 88312 SPECIAL STAINS GROUP 1: CPT

## 2018-04-02 PROCEDURE — 88305 TISSUE EXAM BY PATHOLOGIST: CPT

## 2018-04-02 PROCEDURE — 80048 BASIC METABOLIC PNL TOTAL CA: CPT

## 2018-04-02 PROCEDURE — 30100 INTRANASAL BIOPSY: CPT

## 2018-04-02 PROCEDURE — 30901 CONTROL OF NOSEBLEED: CPT

## 2018-04-02 RX ADMIN — OXYMETAZOLINE HCL STA SPRAY: 0.05 SPRAY NASAL at 21:17

## 2018-04-02 RX ADMIN — OXYMETAZOLINE HCL STA: 0.05 SPRAY NASAL at 21:19

## 2018-04-02 NOTE — OP
OPERATIVE REPORT



DATE OF SURGERY:

04/02/2018



PREOPERATIVE DIAGNOSIS:

Uncontrolled left epistaxis.



POSTOPERATIVE DIAGNOSIS:

Uncontrolled left epistaxis with lesion of the left anterior septum, final pathology

pending.



ANESTHESIA:

General.



OPERATIVE PROCEDURE:

1. Multiple biopsies of lesion of the left anterior septum.

2. Electrocauterization of bleeding lesion of the left anterior septum.



OPERATING SURGEON:

Dr. Angulo



COMPLICATIONS:

None.



ESTIMATED BLOOD LOSS:

Less than 20 mL.



OPERATIVE PROCEDURE DESCRIPTION:

The patient was placed on the operating table in supine position and after uneventful

induction and endotracheal intubation, satisfactory general anesthesia was obtained.

Next, the left naris was inspected.  It was noted that there was a large ulcerating

lesion of the left anterior septum.  This lesion was approximately 1.5 to 2 cm in

diameter and appeared to have rolled or heaped edges.  The area appeared to be somewhat

friable. In addition to this, palpation of the lesion revealed that this lesion had

eroded completely through the anterior cartilage nasal septum.  There was a small

pinpoint area where it had actually perforated. This pinpoint perforation was less than

2 mm.  Therefore, using various up-biting microlaryngeal forceps, biopsies were taken

from the rim of the lesion and also from the substance of the lesion.  Care was taken

not to further enlarge the current pinpoint perforation.  A large biopsy was taken

posteriorly from the area where the tissue appeared to be rolled up or heaped up, and

this was done using standard microlaryngeal up-biting forceps.  All specimens were

placed in formalin and sent to Pathology for permanent sectioning.  Hemostasis was

obtained using electrocautery via the suction cautery tip. Next, the remaining bleeding

was controlled by spraying the entire area with a hemostatic powder agent.  By spraying

with this Hemaderm/hemostatic powder, the bleeding subsided almost instantaneously.

Approximately 5-7 minutes was allowed to elapse to make sure that there was no further

bleeding.  A few more applications of the Hemaderm/hemostatic powder was necessary for

several small areas that were still bleeding.  At this point, the procedure was

terminated.  All specimens were sent to Pathology for permanent sectioning.  Estimated

blood loss was less than 20 mL.  The patient tolerated the procedure well and was

returned to the recovery room in satisfactory condition.  Final pathology is pending.





MMODL / IJN: [id number] / Job#: [id number]

## 2018-04-16 ENCOUNTER — HOSPITAL ENCOUNTER (OUTPATIENT)
Dept: HOSPITAL 47 - BARWHC3 | Age: 58
Discharge: HOME | End: 2018-04-16
Attending: SURGERY
Payer: COMMERCIAL

## 2018-04-16 VITALS — BODY MASS INDEX: 32.7 KG/M2

## 2018-04-16 DIAGNOSIS — E66.01: Primary | ICD-10-CM

## 2018-04-16 PROCEDURE — 97803 MED NUTRITION INDIV SUBSEQ: CPT

## 2018-04-16 PROCEDURE — 99211 OFF/OP EST MAY X REQ PHY/QHP: CPT

## 2018-04-30 ENCOUNTER — HOSPITAL ENCOUNTER (OUTPATIENT)
Dept: HOSPITAL 47 - OR | Age: 58
Discharge: HOME | End: 2018-04-30
Attending: OTOLARYNGOLOGY
Payer: COMMERCIAL

## 2018-04-30 VITALS — TEMPERATURE: 97 F

## 2018-04-30 VITALS — HEART RATE: 67 BPM | DIASTOLIC BLOOD PRESSURE: 88 MMHG | SYSTOLIC BLOOD PRESSURE: 139 MMHG

## 2018-04-30 VITALS — RESPIRATION RATE: 18 BRPM

## 2018-04-30 DIAGNOSIS — M06.9: ICD-10-CM

## 2018-04-30 DIAGNOSIS — G47.33: ICD-10-CM

## 2018-04-30 DIAGNOSIS — H40.9: ICD-10-CM

## 2018-04-30 DIAGNOSIS — Z79.1: ICD-10-CM

## 2018-04-30 DIAGNOSIS — M79.7: ICD-10-CM

## 2018-04-30 DIAGNOSIS — Z87.891: ICD-10-CM

## 2018-04-30 DIAGNOSIS — Z79.891: ICD-10-CM

## 2018-04-30 DIAGNOSIS — M10.9: ICD-10-CM

## 2018-04-30 DIAGNOSIS — M54.9: ICD-10-CM

## 2018-04-30 DIAGNOSIS — Z87.19: ICD-10-CM

## 2018-04-30 DIAGNOSIS — I10: ICD-10-CM

## 2018-04-30 DIAGNOSIS — M19.90: ICD-10-CM

## 2018-04-30 DIAGNOSIS — C44.321: Primary | ICD-10-CM

## 2018-04-30 DIAGNOSIS — K21.9: ICD-10-CM

## 2018-04-30 DIAGNOSIS — Z79.899: ICD-10-CM

## 2018-04-30 DIAGNOSIS — G89.29: ICD-10-CM

## 2018-04-30 PROCEDURE — 11441 EXC FACE-MM B9+MARG 0.6-1 CM: CPT

## 2018-04-30 PROCEDURE — 88305 TISSUE EXAM BY PATHOLOGIST: CPT

## 2018-04-30 RX ADMIN — MORPHINE SULFATE ONE MG: 5 INJECTION, SOLUTION INTRAVENOUS at 13:18

## 2018-04-30 RX ADMIN — MORPHINE SULFATE ONE MG: 5 INJECTION, SOLUTION INTRAVENOUS at 13:13

## 2018-04-30 NOTE — HP
HISTORY AND PHYSICAL



CHIEF COMPLAINT:

Lesion on the left side of the nose.



HISTORY OF PRESENT ILLNESS:

This patient is a pleasant 57-year-old male who was recently seen in my office because

of concern of a enlarging lesion on the left side of his nose.  This lesion apparently

has  been on the skin on the outside of the left side of the nose for the past 3 or 4

months.  He states that the lesion tends to be itchy, but does not bleed and is not

sore.  At the time he was seen in my office, on  examination of the area reveals

patient has a approximately a 0.5-0.7 mm well-circumscribed maculopapular lesion which

appeared to be somewhat dry and crusty and did not bleed with manipulation and is

located on the left nasal alar rim.  It is possible this lesion might represent a mole

or possible basal cell carcinoma.  The patient stated that he would like to have this

removed and therefore it was recommended that he be scheduled for removal of this

lesion under general anesthesia.



PAST MEDICAL HISTORY:

Reveals the patient has no known allergies to medications.



MEDICATIONS:

His current medications include: Oxycodone, Prilosec, allopurinol, calcitriol,

Flexeril, indomethacin, Neurontin and metoprolol and Belsomra.



There is no history of asthma or diabetes mellitus.



REVIEW OF SYSTEMS:

Cardiovascular is positive for hypertension. Respiratory is negative.

GASTROINTESTINAL:  Positive for GERD, gastroesophageal reflux disorder. Metabolic/

endocrine system is positive for hypothyroidism, musculoskeletal system is positive for

fibromyalgia, gout, and degenerative osteoarthritis.  The remainder of the review of

systems is essentially unremarkable.



PREVIOUS SURGERIES:

Include control of epistaxis and multiple biopsies of lesion of the left anterior nasal

septum, tonsillectomy, adenoidectomy, back surgery, gastric sleeve surgery,

laparoscopy, cholecystectomy, and colonoscopy.



PHYSICAL EXAMINATION:

This patient is a pleasant 57-year-old male who was alert and cooperative. HEENT

examination patient is normocephalic. Tympanic membranes are normal.  Middle ear spaces

are free of any fluid or infection.  Pupils are equal, round, reactive to light and

accommodation.  Extraocular movements are within normal limits.  Intranasal examination

reveals the patient has moderate to severe septal deviation with a large area of

excoriation anteriorly on the left side of the cartilage portion of the septum.  This

area has previously been biopsied and appears to be trying to heal and is not actively

bleeding at this time. Bare cartilage is

not exposed, but it is known that the patient did have a pinpoint perforation of this

area discovered at the time of surgery.  Examination of the oropharynx,  palpation of

the neck, cranial nerves 2-12, remainder of the head and neck exam are within normal

limits.  Cardiovascular:  Both lung fields are clear to percussion and auscultation.

The patient is in regular sinus rhythm.  S1 and S2 are present.  There is no evidence

of any murmurs S3s or S4.  Peripheral pulses are bilaterally symmetrical.

ABDOMEN: There is no evidence of masses, megaly or tenderness.  ABDOMEN:  Soft.  Skin

is unremarkable.  Musculoskeletal and neurological are within normal limits.  Rectal

examination exam is deferred at this time because the patient has this done on a

regular basis at his family physician's office.  The remainder of the physical exam is

essentially unremarkable.



IMPRESSION:

Lesion of the left nasal ala externally.



PLAN:

The patient is scheduled to undergo complete excision of left nasal ala lesion under

general anesthesia in the a.m.



Attention RNs in the pre-surgical area:  I have not ordered any pre-surgical

prophylactic antibiotics for this patient.  If the pharmacy department sends any pre-

surgical prophylactic antibiotics to the pre-surgical area for this patient, they

should be returned to the pharmacy department and make sure that the patient's account

is credited appropriately.  In addition, I have not ordered any other medications such

as Ofirmev.





I have discussed the risks, benefits and alternative therapies for the above-mentioned

procedure and for both sedation/analgesia as well as necessary blood product

administration, if indicated, as they pertain to this patient.  The patient has

indicated his or her understanding and acceptance of the risks and procedures

discussed.





MMODL / IJN: [id number] / Job#: [id number]

## 2018-05-01 NOTE — OP
OPERATIVE REPORT



DATE OF SURGERY:

04/30/2018



PREOPERATIVE DIAGNOSIS:

Lesion of the skin of the left side of the nose externally.



POSTOPERATIVE DIAGNOSIS:

Maculopapular lesion of the skin overlying the left nasal alae, pathology pending.



ANESTHESIA:

General.



OPERATIVE PROCEDURE:

Excision of lesion of the left nasal alae externally.



OPERATING SURGEON:

Dr. Angulo.



COMPLICATIONS:

None.



ESTIMATED BLOOD LOSS:

Less than 2 mL.



OPERATIVE PROCEDURE:

The patient is placed on the operating table in supine position and after uneventful

induction and general anesthesia, satisfactory general anesthesia was obtained. The

patient's face was draped in usual customary fashion.  The patient's left radha face was

prepped in the usual fashion. The lesion in question which was approximately 0.7 to 1

cm in diameter was noted and the proposed incision in the exit proposed incision was

outlined using Codman marking pen in an elliptical fashion. Next, using a #15 scalpel

blade incision was made through the skin down through subcutaneous tissue and

subcutaneous fat down to the level of the cartilage.  The specimen was excised

completely and sent to for pathology in formalin.  It was tagged as follows: A black

suture was placed superiorly, white suture was placed inferiorly, a blue suture was

placed posteriorly and a clear suture was placed anteriorly.  Hemostasis was obtained

using electrocautery.  Next, the wound defect was closed by generously undermining the

area using a pair of curved Rodrigo scissors. The deep tissues were closed using a

single 5-0 interrupted Vicryl suture. The skin edges were brought together nicely using

interrupted Ethilon simple sutures. At this point, the procedure was terminated.  There

were no intraoperative complications. This was a multiple layer closure and therefore

complex closure.  Estimated blood loss less than 2 mL.  The patient tolerated procedure

well and was returned to recovery room in satisfactory condition.  Final pathology is

pending.





MMODL / IJN: [id number] / Job#: [id number]

## 2019-03-25 ENCOUNTER — HOSPITAL ENCOUNTER (OUTPATIENT)
Dept: HOSPITAL 47 - BARWHC3 | Age: 59
Discharge: HOME | End: 2019-03-25
Attending: SURGERY
Payer: COMMERCIAL

## 2019-03-25 VITALS — BODY MASS INDEX: 32.3 KG/M2

## 2019-03-25 VITALS — SYSTOLIC BLOOD PRESSURE: 151 MMHG | HEART RATE: 60 BPM | TEMPERATURE: 97.5 F | DIASTOLIC BLOOD PRESSURE: 101 MMHG

## 2019-03-25 DIAGNOSIS — G47.33: ICD-10-CM

## 2019-03-25 DIAGNOSIS — Z87.891: ICD-10-CM

## 2019-03-25 DIAGNOSIS — Z99.89: ICD-10-CM

## 2019-03-25 DIAGNOSIS — Z98.84: ICD-10-CM

## 2019-03-25 DIAGNOSIS — Z09: Primary | ICD-10-CM

## 2019-03-25 DIAGNOSIS — Z90.89: ICD-10-CM

## 2019-03-25 DIAGNOSIS — K21.9: ICD-10-CM

## 2019-03-25 DIAGNOSIS — Z98.890: ICD-10-CM

## 2019-03-25 PROCEDURE — 99211 OFF/OP EST MAY X REQ PHY/QHP: CPT

## 2019-03-25 PROCEDURE — 97803 MED NUTRITION INDIV SUBSEQ: CPT

## 2019-03-29 NOTE — P.HPBAR
Bariatric H&P





- History & Physicial


H&P Date: 03/25/19


History & Physicial: 


Visit/CC: two year follow up





Patient initial contact: 





Initial weight: 141.719 kg


Initial weight in pounds: 312.44


Height: 5 ft 10 in


Initial BMI: 44.8





Last weight: 





Current weight: 102.058 kg


Current weight in pounds: 225.00


Current BMI: 32.3





Ideal body weight (based on NIH guidelines): 75.296 kg





Excess body weight loss: 59.7%








The patient is a 58 year-old M who presents for Bariatric Assessment.  Patient 

presents today for sleeve gastrectomy follow-up.  He's had some minimal GERD.  

Patient has struggled with weight loss over the last several months.














Past Medical History


Past Medical History: Eye Disorder, GERD/Reflux, Hypertension, Musculoskeletal 

Disorder, Osteoarthritis (OA), Rheumatoid Arthritis (RA), Sleep Apnea/CPAP/BIPAP


Additional Past Medical History / Comment(s): Frequent EDEMA TO RUFUS EXTREMITIES-

none currently, HX CELLULITIS,GLAUCOMA.  Stomach Ulcer caused infection to 

Liver-ended up w/sepsis 2016, affected kidney function @ that time but fine now,

 Hx Bowel obst-self resolved.  Chronic back pain due to stenosis.  USES CPAP.


History of Any Multi-Drug Resistant Organisms: None Reported


Past Surgical History: Bariatric Surgery, Hernia Repair, Tonsillectomy


Additional Past Surgical History / Comment(s): C6 Spinal Fusion.  Hiatal Hernia 

Repair.  Drainage of Liver Abcess.  EGD, COLONOSCOPY.sleeve gastrectomy 3-24-17


Past Anesthesia/Blood Transfusion Reactions: No Reported Reaction


Additional Past Anesthesia/Blood Transfusion Reaction / Comm: hx no blood 

transfusion


Past Psychological History: No Psychological Hx Reported


Smoking Status: Former smoker


Past Alcohol Use History: None Reported


Additional Past Alcohol Use History / Comment(s): Quit Smoking 2009, Started 

Smoking 1989, Smoked 1ppd


Past Drug Use History: None Reported





- Past Family History


  ** Mother


Family Medical History: No Reported History





  ** Father


Additional Family Medical History / Comment(s): heart problems-living at age 83





Surgical - Exam


                                   Vital Signs











Temp Pulse BP


 


 97.5 F L  60   151/101 


 


 03/25/19 15:19  03/25/19 15:19  03/25/19 15:19














- General


well developed, well nourished, no distress





- Abdomen


Abdomen: soft, non tender





Bariatric Assessment & Plan


Plan: 





Status post sleeve yesterday.  Patient me to the dietitian today.  His GERD is 

minimal will be observed.  He'll follow-up in 4 weeks.





Bariatric Checklist


Checklist: 


Plan: 





Checklist: 





EGD: 


1. Hiatal hernia: 


2. H. Pylori: 





HgbA1c: 





Vitamin D: 





Smoking: Former smoker





Primary care physician referral: Dr. Cid





Psychiatry clearance: 





Cardiology clearance: 





Sleep study: 





Diet journal: 





VTE risk score: 





VTE risk level: 





Rehab needs at discharge:

## 2021-03-25 ENCOUNTER — HOSPITAL ENCOUNTER (OUTPATIENT)
Dept: HOSPITAL 47 - ORWHC2ENDO | Age: 61
Discharge: HOME | End: 2021-03-25
Attending: SURGERY
Payer: COMMERCIAL

## 2021-03-25 VITALS — BODY MASS INDEX: 30.5 KG/M2

## 2021-03-25 VITALS — HEART RATE: 63 BPM | SYSTOLIC BLOOD PRESSURE: 117 MMHG | DIASTOLIC BLOOD PRESSURE: 86 MMHG

## 2021-03-25 VITALS — TEMPERATURE: 97.5 F | RESPIRATION RATE: 16 BRPM

## 2021-03-25 DIAGNOSIS — D64.9: ICD-10-CM

## 2021-03-25 DIAGNOSIS — Z79.891: ICD-10-CM

## 2021-03-25 DIAGNOSIS — Z98.84: ICD-10-CM

## 2021-03-25 DIAGNOSIS — Z98.1: ICD-10-CM

## 2021-03-25 DIAGNOSIS — H40.9: ICD-10-CM

## 2021-03-25 DIAGNOSIS — I10: ICD-10-CM

## 2021-03-25 DIAGNOSIS — Z99.89: ICD-10-CM

## 2021-03-25 DIAGNOSIS — M06.9: ICD-10-CM

## 2021-03-25 DIAGNOSIS — M19.90: ICD-10-CM

## 2021-03-25 DIAGNOSIS — K29.50: Primary | ICD-10-CM

## 2021-03-25 DIAGNOSIS — Z90.49: ICD-10-CM

## 2021-03-25 DIAGNOSIS — Z90.89: ICD-10-CM

## 2021-03-25 DIAGNOSIS — K27.9: ICD-10-CM

## 2021-03-25 DIAGNOSIS — G47.30: ICD-10-CM

## 2021-03-25 DIAGNOSIS — Z98.890: ICD-10-CM

## 2021-03-25 DIAGNOSIS — Z80.8: ICD-10-CM

## 2021-03-25 DIAGNOSIS — Z85.828: ICD-10-CM

## 2021-03-25 DIAGNOSIS — Z87.891: ICD-10-CM

## 2021-03-25 DIAGNOSIS — Z80.42: ICD-10-CM

## 2021-03-25 DIAGNOSIS — Z79.899: ICD-10-CM

## 2021-03-25 DIAGNOSIS — Z86.19: ICD-10-CM

## 2021-03-25 DIAGNOSIS — K21.9: ICD-10-CM

## 2021-03-25 DIAGNOSIS — Z82.49: ICD-10-CM

## 2021-03-25 PROCEDURE — 43239 EGD BIOPSY SINGLE/MULTIPLE: CPT

## 2021-03-25 PROCEDURE — 88305 TISSUE EXAM BY PATHOLOGIST: CPT

## 2021-03-25 NOTE — P.OP
Date of Procedure: 03/25/21


Preoperative Diagnosis: 


Peptic ulcer disease


Postoperative Diagnosis: 


Antral gastritis


Implants: 


EGD


Anesthesia: MAC


Surgeon: Kam Betancourt


Pathology: other (Antrum)


Condition: stable


Disposition: PACU


Description of Procedure: 


The patient's placed on the endoscopy table in the lateral position.  She re

ceived he received IV sedation.  The gastroscope placed oropharynx passed in the

esophagus into the stomach.  The patient a previous partial gastrectomy appeared

to be along the greater curvature stomach.  Scope was positioned into the antrum

and then through the pylorus.  The first and second portion of duodenum appeared

normal.  Scope was then brought back the antrum this appeared mildly inflamed.  

Biopsies performed.  Scope was then withdrawn from the stomach.  There is no 

evidence of any peptic ulcer disease.  The GE junction was at 40 cm the distal 

esophagus appeared.  The proximal esophagus appeared normal.  Scope was 

withdrawn for patient.

## 2021-03-25 NOTE — P.GSHP
History of Present Illness


H&P Date: 03/25/21


Chief Complaint: Peptic ulcer disease





This is a 60-year-old male presents safer EGD.  He's had issues with some mild 

GERD.  He is also had flatus of epigastric dull pain.  History of peptic ulcer 

disease.





Past Medical History


Past Medical History: Cancer, GERD/Reflux, Hypertension, Musculoskeletal 

Disorder, Osteoarthritis (OA), Rheumatoid Arthritis (RA), Sleep Apnea/CPAP/BIPAP


Additional Past Medical History / Comment(s): OCC. EDEMA TO RUFUS EXTREMITIES, HX 

CELLULITIS,GLAUCOMA.  Stomach Ulcer caused infection to Liver-ended up w/sepsis 

2016, affected kidney function @ that time,  Hx Bowel obstruction.  spinal 

stenosis.  no cpap used. current "sinus problems"-on rx, recent abdominal pain-

now cleared up, anemia, hx skin cancer


History of Any Multi-Drug Resistant Organisms: None Reported


Past Surgical History: Back Surgery, Bariatric Surgery, Cholecystectomy, Hernia 

Repair, Tonsillectomy


Additional Past Surgical History / Comment(s): C6 Spinal Fusion.  Hiatal Hernia 

Repair.  Drainage of Liver Abcess.  EGD, COLONOSCOPY, sleeve gastrectomy, "neck 

surgery with cadaver bone", skin cancer removed from nose,


Past Anesthesia/Blood Transfusion Reactions: No Reported Reaction


Additional Past Anesthesia/Blood Transfusion Reaction / Comment(s): hx no blood 

transfusion


Smoking Status: Former smoker





- Past Family History


  ** Mother


Family Medical History: No Reported History





  ** Father


Additional Family Medical History / Comment(s): heart problems-living at age 83





  ** Brother(s)


Family Medical History: Cancer


Additional Family Medical History / Comment(s): prostate,skin





Medications and Allergies


                                Home Medications











 Medication  Instructions  Recorded  Confirmed  Type


 


Metoprolol Tartrate [Lopressor] 50 mg PO BID 02/23/16 03/25/21 History


 


Multivitamin [Men's Multi-Vitamin] 1 tab PO DAILY 06/22/16 03/25/21 History


 


Omeprazole 40 mg PO DAILY 06/22/16 03/25/21 History


 


allopurinoL [Zyloprim] 100 mg PO DAILY 10/17/16 03/25/21 History


 


Docusate [Colace] 100 mg PO BID 03/20/17 03/25/21 History


 


calcitrioL [Rocaltrol] 0.25 mcg PO TUFR 03/20/17 03/25/21 History


 


Sucralfate [Carafate] 1 gm PO ACHS #90 tablet 03/27/17 03/25/21 Rx


 


Ferrous Sulfate [Iron] 325 mg PO HS 04/02/18 03/25/21 History


 


oxyCODONE-APAP 10-325MG [Percocet 1 tab PO QID 04/02/18 03/25/21 History





 mg]    


 


Acetaminophen [Tylenol Extra 500 mg PO AS DIRECTED PRN 03/23/21 03/25/21 History





Strength]    


 


Cephalexin [Keflex] 500 mg PO TID 03/23/21 03/25/21 History


 


Cetirizine HCl [Zyrtec] 10 mg PO DAILY 03/23/21 03/25/21 History


 


DULoxetine HCL [Cymbalta] 30 mg PO HS 03/23/21 03/25/21 History


 


Gabapentin [Neurontin] 600 mg PO QID 03/23/21 03/25/21 History


 


Indomethacin [Indocin] 50 mg PO BID 03/23/21 03/25/21 History


 


Vitamin B Complex 1 each PO DAILY 03/23/21 03/25/21 History


 


amLODIPine [Norvasc] 10 mg PO HS 03/23/21 03/25/21 History


 


diazePAM [Diazepam] 2 mg PO TID 03/23/21 03/25/21 History


 


methocarbamoL [Robaxin] 500 mg PO QID 03/23/21 03/25/21 History








                                    Allergies











Allergy/AdvReac Type Severity Reaction Status Date / Time


 


No Known Allergies Allergy   Verified 03/25/21 08:38














Surgical - Exam


                                   Vital Signs











Temp Pulse Resp BP Pulse Ox


 


 97.5 F L  80   16   125/90   96 


 


 03/25/21 08:36  03/25/21 08:36  03/25/21 08:36  03/25/21 08:36  03/25/21 08:36














- General


well developed, well nourished, no distress, moderate distress





- ENT


normal pinna





- Neck


no masses





- Respiratory


normal expansion





- Cardiovascular


Rhythm: regular





- Abdomen


Abdomen: soft, non tender





Assessment and Plan


Assessment: 





History of peptic ulcers





Abdominal pain





We'll perform EGD.

## 2021-10-12 ENCOUNTER — HOSPITAL ENCOUNTER (OUTPATIENT)
Dept: HOSPITAL 47 - RADMRIMAIN | Age: 61
Discharge: HOME | End: 2021-10-12
Attending: FAMILY MEDICINE
Payer: MEDICARE

## 2021-10-12 DIAGNOSIS — M75.102: Primary | ICD-10-CM

## 2021-10-12 DIAGNOSIS — M19.012: ICD-10-CM

## 2021-10-12 PROCEDURE — 73223 MRI JOINT UPR EXTR W/O&W/DYE: CPT

## 2021-10-12 NOTE — MR
EXAMINATION TYPE: MR shoulder LT wo/w con

 

DATE OF EXAM: 10/12/2021

 

COMPARISON: None

 

HISTORY: Unspecified rotator cuff tear or rupture left shoulder, tendonitis, pain x 6 weeks

 

TECHNIQUE: Multiplanar, multisequence images of the left shoulder is performed without and with 10 mL
 intravenous Gadavist gadolinium contrast.  

 

FINDINGS:

 

Rotator Cuff: The rotator cuff shows abnormal increased intrinsic signal, the tendon is markedly atte
nuated. Some minimal fibers of infraspinatus are thought to be intact, supraspinatus tendon shows a f
ull-thickness tear with retraction to the level of the midportion of the acromion. Subscapularis tend
on, teres minor remain in place. Partial full-thickness tear present involving infraspinatus tendon.

 

Acromioclavicular Joint: There is arthropathy change present, distal acromial spur is present, distal
 acromion is somewhat downturned.

 

Glenohumeral Joint: The humerus is somewhat high riding in relation to the glenohumeral joint. There 
is some remodeling of the humeral head.

 

Labrum: The labrum appears grossly intact given limitation of non-arthrogram study.

 

Biceps Tendon: Long head of biceps tendon shows fluid signal around it but in normal position.

 

Bone marrow signal: Pseudocysts present within the humeral head.

 

Other: There is a joint effusion. Fluid signal is present in the subacromial subdeltoid bursa. Postco
ntrast images show enhancement of the synovium

 

IMPRESSION: 

Rotator cuff tear, osteoarthritis, distal acromial spur, acromioclavicular joint arthropathy.

## 2023-07-12 ENCOUNTER — HOSPITAL ENCOUNTER (OUTPATIENT)
Dept: HOSPITAL 47 - RADMRIMAIN | Age: 63
Discharge: HOME | End: 2023-07-12
Attending: FAMILY MEDICINE
Payer: MEDICARE

## 2023-07-12 DIAGNOSIS — M50.321: Primary | ICD-10-CM

## 2023-07-12 DIAGNOSIS — M99.71: ICD-10-CM

## 2023-07-12 DIAGNOSIS — M75.21: ICD-10-CM

## 2023-07-12 PROCEDURE — 73221 MRI JOINT UPR EXTREM W/O DYE: CPT

## 2023-07-12 PROCEDURE — 72156 MRI NECK SPINE W/O & W/DYE: CPT

## 2023-07-12 NOTE — MR
EXAMINATION TYPE: MR cervical spine wo/w con

 

DATE OF EXAM: 7/12/2023

 

INDICATION: 

Patient age: Male;  63 years old; 

Reason for study: M75.21 M50.30; PHH. DDD, Numbness in arms and fingers, Balance issues

 

COMPARISON: None

 

TECHNIQUE: Multi planar, multi sequence imaging was performed utilizing: T1-weighted, T2-weighted, an
d turbo inversion recovery imaging of the cervical spine. 

IV Contrast: 12 cc Gadavist

 

FINDINGS: 

Alignment: The cervical vertebral bodies have preserved heights. Increased kyphosis of the cervical a
lignment.

 

Bones: Postsurgical changes to the cervical spine at C3 and C4. Multilevel disc space narrowing and o
steophyte formation with f no abnormal postcontrast enhancement. Acet and uncovertebral joint arthrop
athy.

 

Cord: The spinal cord is unremarkable with regards to their signal intensity and morphology. Scattere
d areas of cord signal change including at the level of C3-C4 level of C4-C5 on the right posterior a
spect no abnormal postcontrast enhancement.

 

Discs:  Multilevel disc desiccation is present.

 

C2-C3: No significant disc pathology. The spinal canal is patent.  Bilateral facet and uncovertebral 
joint arthropathy are present with mild bilateral neural foraminal stenosis.

 

C3-C4: No significant disc pathology. The spinal canal is patent.  Bilateral facet and uncovertebral 
joint arthropathy are present with mild to moderate bilateral neural foraminal stenosis.

 

C4-C5: A disc osteophyte complex is present with moderate to severe spinal canal stenosis.  Bilateral
 facet and uncovertebral joint arthropathy are present with moderate to severe bilateral neural marco
inal stenosis.

 

C5-C6: A disc osteophyte complex is present with moderate spinal canal stenosis.  Bilateral facet and
 uncovertebral joint arthropathy are present with moderate to severe right and moderate left neural f
oraminal stenosis.

 

C6-C7: A disc osteophyte complex is present with mild to moderate spinal canal stenosis.  Bilateral f
acet and uncovertebral joint arthropathy are present with moderate to severe right and moderate left 
neural foraminal stenosis.

 

C7-T1: A disc osteophyte complex is present which minimally narrows the ventral subarachnoid space.  
 Bilateral facet and uncovertebral joint arthropathy are present with moderate right and mild left ne
ural foraminal stenosis.

 

Other: None.

 

IMPRESSION:

1.  Post fixation changes with few scattered foci of cord signal change. No abnormal postcontrast enh
ancement.

2.  Multilevel disc degeneration changes throughout the spine with spinal canal stenosis worse at C4-
C5 with moderate to severe and moderate at C5-C6.

3.  Degeneration changes with neural foraminal stenosis as described above.

## 2023-09-11 ENCOUNTER — HOSPITAL ENCOUNTER (OUTPATIENT)
Dept: HOSPITAL 47 - RADMRIMAIN | Age: 63
Discharge: HOME | End: 2023-09-11
Attending: NEUROLOGICAL SURGERY
Payer: MEDICARE
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
## 2023-09-11 DIAGNOSIS — M67.813: ICD-10-CM

## 2023-09-11 DIAGNOSIS — M75.21: ICD-10-CM

## 2023-09-11 DIAGNOSIS — M25.411: ICD-10-CM

## 2023-09-11 DIAGNOSIS — R29.6: ICD-10-CM

## 2023-09-11 DIAGNOSIS — S46.011A: Primary | ICD-10-CM

## 2023-09-11 DIAGNOSIS — M19.011: ICD-10-CM

## 2023-09-12 ENCOUNTER — HOSPITAL ENCOUNTER (OUTPATIENT)
Dept: HOSPITAL 47 - RADMRIMAIN | Age: 63
Discharge: HOME | End: 2023-09-12
Attending: NEUROLOGICAL SURGERY
Payer: MEDICARE

## 2023-09-12 DIAGNOSIS — M25.511: ICD-10-CM

## 2023-09-12 DIAGNOSIS — M47.25: ICD-10-CM

## 2023-09-12 DIAGNOSIS — M50.121: Primary | ICD-10-CM

## 2023-09-12 DIAGNOSIS — M48.062: ICD-10-CM

## 2023-09-12 DIAGNOSIS — M99.73: ICD-10-CM

## 2023-09-12 DIAGNOSIS — M51.15: ICD-10-CM

## 2023-09-12 DIAGNOSIS — M99.72: ICD-10-CM

## 2023-09-12 DIAGNOSIS — M48.02: ICD-10-CM

## 2023-09-12 DIAGNOSIS — M99.71: ICD-10-CM

## 2023-09-12 PROCEDURE — 72148 MRI LUMBAR SPINE W/O DYE: CPT

## 2023-09-12 PROCEDURE — 72141 MRI NECK SPINE W/O DYE: CPT

## 2023-09-12 NOTE — MR
EXAMINATION TYPE: MR shoulder RT wo con

 

DATE OF EXAM: 9/11/2023

 

COMPARISON: 7/12/2023

 

HISTORY: 63-year-old male M25.511, Rt shoulder pain, fallen several times since last MRI, doctor want
s to compare to previous

 

TECHNIQUE: Multiplanar, multisequence imaging of the right shoulder is performed without contrast.

 

FINDINGS:

The intracapsular portion of the long head biceps tendon is no longer well seen and may have torn in 
the interval. Possibly scarred down the bicipital groove region.

 

Severe heterogeneity of the subscapularis tendon though a partial thickness tear throughout the artic
ular sided fibers.

 

Moderate degenerative change at the AC joint with capsular hypertrophy. Spurring is present predomina
tely superiorly.

 

Mild to moderate effusion and synovitis in the subacromial/subdeltoid bursa now with  full-thickness 
tear involving the prior supraspinatus and infraspinatus tendons and with retraction of up to 4.5 cm 
to the level of the glenohumeral joint.

 

Extensive edema is present throughout both supraspinatus and infraspinatus tendons and there is mild 
fatty infiltration.

 

Teres minor remains intact.

 

Degenerative signal in the superior labrum at the level of the biceps anchor.

 

There is a moderate joint effusion contiguous with the overlying bursa.

 

Overall glenoid humeral joint appears intact.

 

No Hill-Sachs deformity or os acromiale.

 

Heterogeneous marrow signal suggesting red marrow hyperplasia. No suspicious bone marrow replacement.


 

Small loose bodies are present posteriorly measuring up to 4 mm suggesting mild degenerative change t
hroughout the glenohumeral joint.

 

 

 

IMPRESSION: 

 

1. Interval progression in the patient's rotator cuff tear now with complete full-thickness tears inv
olving the entire supraspinatus and infraspinatus tendons. Further retraction with the stump now loca
rocky at the level of the glenohumeral joint, retracted by 4.5 cm.

2. Reactive muscle edema within the supraspinatus and infraspinatus muscle bellies. Mild fatty infilt
ration of the muscle bellies as well.

3. Progression in articular sided tear throughout the subscapularis tendon with background moderate t
o severe tendinosis. No definite full-thickness tear of the subscapularis tendon.

4. The intracapsular portion of the long head biceps tendon is no longer well seen. It may be torn an
d scarred down in the bicipital groove.

5. Moderate AC joint OA. Scattered mild degenerative change within the glenohumeral joint. Moderate j
oint effusion contiguous with the bursal effusion.

## 2023-09-12 NOTE — MR
EXAMINATION TYPE: MR cspine/lspine wo con

 

DATE OF EXAM: 9/12/2023 5:58 PM

 

CLINICAL INDICATION:Male, 63 years old with history of M48.02,M50.90,M54.12.M51.9, M48.062,M54.16; PH
H, Falls, neck pain that radiates down arms, low back pain that radiates down legs, bilateral leg wea
kness.

 

COMPARISON: 7/12/2023 lumbar spine 9/15/2011.

 

TECHNIQUE:  Multi planar, multi sequence imaging was performed utilizing: T1-weighted, T2-weighted, a
nd turbo inversion recovery imaging of the cervical and lumbar spine. 

MR contrast: IV Contrast: , None.

 

FINDINGS:

CERVICAL:

Alignment: The cervical vertebral bodies have preserved heights. Alignment is within normal limits gi
stanley patient positioning. 

 

Bones: Scattered Modic endplate changes with osteophytes and disc space narrowing. Multilevel degener
ative disc disease is noted and most pronounced at the C5-C7 vertebral levels.No abnormal inversion r
ecovery signal. Postsurgical changes at C3-C4.

 

Cord: Abnormal cord signal at the level of C3-C4 disc level and area of high-grade stenosis on prior 
imaging. This appears chronic without evidence of cord dilation to suggest edema.

 

Discs:  Multilevel disc desiccation.

 

C2-C3: No significant disc pathology. The spinal canal is patent.  Bilateral facet and uncovertebral 
joint arthropathy are present with mild to moderate bilateral neural foraminal stenosis.

 

C3-C4: No significant disc pathology. The spinal canal is patent.  Bilateral facet and uncovertebral 
joint arthropathy are present with mild bilateral neural foraminal stenosis.

 

C4-C5: A disc osteophyte complex is present with moderate spinal canal stenosis.  Bilateral facet and
 uncovertebral joint arthropathy are present with moderate right and moderate to severe left neural f
oraminal stenosis.

 

C5-C6: A disc osteophyte complex is present with moderate spinal canal stenosis.  Bilateral facet and
 uncovertebral joint arthropathy are present with moderate to severe right and moderate left neural f
oraminal stenosis.

 

C6-C7: A disc osteophyte complex is present with mild spinal canal stenosis.  Bilateral facet and unc
overtebral joint arthropathy are present with moderate to severe right and moderate left neural marco
inal stenosis.

 

C7-T1: No significant disc pathology. The spinal canal is patent.  Bilateral facet and uncovertebral 
joint arthropathy are present with moderate right and mild left neural foraminal stenosis.

 

Other: None.

 

LUMBAR:

Alignment: The lumbar vertebral bodies have preserved heights and alignment.  

 

Cord: The conus medullaris and the distal spinal cord appear unremarkable with regards to their signa
l intensity and morphology. 

 

Bones/Discs: Multilevel degeneration changes with osteophyte formation, disc space narrowing, Schmorl
's nodes and facet joint arthropathy. No abnormal inversion recovery signal.

 

T12-L1: Right central disc protrusion which impresses on the cauda equina. Facet joint arthropathy wi
th moderate bilateral neural foraminal stenosis. L1-L2: Disc bulge and facet joint arthropathy result
 in mild spinal canal and moderate bilateral neural foraminal stenosis. 

 

L2-L3: Disc bulge and facet joint arthropathy result in mild spinal canal and and moderate bilateral 
neural foraminal stenosis. 

 

L3-L4: Disc bulge and facet joint arthropathy result in mild spinal canal and moderate to severe bila
teral neural foraminal stenosis. 

 

L4-L5: Central disc extrusion with 6 cm inferior migration. No significant spinal canal stenosis. Fac
et joint arthropathy with moderate to severe bilateral neural foraminal stenosis. 

 

L5-S1: The disc is rounded posterior morphology without significant spinal canal stenosis. Facet join
t arthropathy with mild neural foraminal stenosis. 

 

Other findings:  Postsurgical changes of the soft tissues of the back with susceptibility artifact.

 

IMPRESSION:

Cervical:

1.  Multilevel disc degeneration changes throughout the cervical spine with varying degrees of mild-t
o-moderate moderate to severe neural foraminal stenosis as described above.

2.  Sequela prior high-grade stenosis at C3-C4 with increased cord signal.

3. Moderate C4-C5 and C5-6 without canal stenosis.

 

Lumbar:

1.  Disc degeneration changes with severe bilateral L3-L4 and L4-L5 neural foraminal stenosis.

2.  L4-L5 disc extrusion with 6 mm superior migration.

3.  T12-L1 right disc protrusion which impresses on the cauda equina.

4.  No evidence of high-grade spinal canal stenosis in the lumbar spine.

## 2025-07-31 NOTE — ED
ENT HPI





- General


Chief complaint: ENT


Stated complaint: Coughing up blood


Time Seen by Provider: 04/02/18 19:52


Source: patient


Mode of arrival: ambulatory


Limitations: no limitations





- History of Present Illness


Initial comments: 





57-year-old  male status post left-sided epistaxis surgical repair 

today by Dr. Angulo presented for evaluation of continued oozing epistaxis 

with the clots in the throat that he is intermittently coughing up.  On chart 

review Dr. Angulo performed his procedure under anesthesia in order to view 

the bleeding area.  He found an ulcerating lesion as well as a septal 

perforation.  Biopsies were taken, cautery was performed, and hemostatic powder 

applied to the area of bleeding with resolution intraoperatively.  He advised 

the patient to follow-up as an outpatient and gave strict instructions for 

further care.  The patient states that he has not taken any medications or 

applied any sprays as he was instructed not to.  Denies any other symptoms.  

States he feels well and that his symptoms are minimal.





- Related Data


 Home Medications











 Medication  Instructions  Recorded  Confirmed


 


Metoprolol Tartrate [Lopressor] 50 mg PO DAILY 02/23/16 04/02/18


 


Cyclobenzaprine [Flexeril] 5 mg PO DAILY PRN 06/22/16 04/02/18


 


Gabapentin [Neurontin] 300 mg PO QID 06/22/16 04/02/18


 


Multivitamin [Men's Multi-Vitamin] 1 tab PO DAILY 06/22/16 04/02/18


 


Omeprazole 40 mg PO DAILY 06/22/16 04/02/18


 


Suvorexant [Belsomra] 20 mg PO HS 06/22/16 04/02/18


 


Allopurinol [Zyloprim] 300 mg PO DAILY 10/17/16 04/02/18


 


Calcitriol [Rocaltrol] 0.25 mcg PO MOTH 03/20/17 04/02/18


 


Docusate [Colace] 100 mg PO DAILY 03/20/17 04/02/18


 


Modafinil [Provigil] 200 mg PO BID 03/20/17 04/02/18


 


Phenylephrine HCl [4 Way] 1 spray EA NOSTRIL DAILY PRN 03/20/17 04/02/18


 


Indomethacin [Indocin] 50 mg PO DAILY 03/28/18 04/02/18


 


Ferrous Sulfate [Iron] 325 mg PO DAILY 04/02/18 04/02/18


 


Iron Ps Cmplx/Vit B12/FA 1 cap PO DAILY 04/02/18 04/02/18





[Niferex-150 Forte]   


 


Magnesium Oxide [Mag-Ox] 250 mg PO DAILY 04/02/18 04/02/18


 


Metoclopramide [Reglan] 5 mg PO Q6H PRN 04/02/18 04/02/18


 


oxyCODONE-APAP 10-325MG [Percocet 2 tab PO Q4HR PRN 04/02/18 04/02/18





 mg]   








 Previous Rx's











 Medication  Instructions  Recorded


 


Sucralfate [Carafate] 1 gm PO ACHS #90 tablet 03/27/17











 Allergies











Allergy/AdvReac Type Severity Reaction Status Date / Time


 


No Known Allergies Allergy   Verified 04/02/18 20:14














Review of Systems


ROS Statement: 


Those systems with pertinent positive or pertinent negative responses have been 

documented in the HPI.





ROS Other: All systems not noted in ROS Statement are negative.


Constitutional: Denies: fever, chills


ENT: Reports: epistaxis.  Denies: ear pain, throat pain


Respiratory: Denies: cough, dyspnea


Cardiovascular: Denies: chest pain, palpitations


Gastrointestinal: Denies: abdominal pain, nausea, vomiting





Past Medical History


Past Medical History: Eye Disorder, GERD/Reflux, Hypertension, Musculoskeletal 

Disorder, Osteoarthritis (OA), Rheumatoid Arthritis (RA), Sleep Apnea/CPAP/BIPAP


Additional Past Medical History / Comment(s): Frequent EDEMA TO RUFUS EXTREMITIES-

none currently, HX CELLULITIS,GLAUCOMA.  Stomach Ulcer caused infection to Liver

-ended up w/sepsis 2016, affected kidney function @ that time but fine now,  Hx 

Bowel obst-self resolved.  Chronic back pain due to stenosis.  USES CPAP.


History of Any Multi-Drug Resistant Organisms: None Reported


Past Surgical History: Bariatric Surgery, Hernia Repair, Tonsillectomy


Additional Past Surgical History / Comment(s): C6 Spinal Fusion.  Hiatal Hernia 

Repair.  Drainage of Liver Abcess.  EGD, COLONOSCOPY.sleeve gastrectomy 3-24-17


Past Anesthesia/Blood Transfusion Reactions: No Reported Reaction


Additional Past Anesthesia/Blood Transfusion Reaction / Comment(s): hx no blood 

transfusion


Past Psychological History: No Psychological Hx Reported


Smoking Status: Former smoker





- Past Family History


  ** Mother


Family Medical History: No Reported History





  ** Father


Additional Family Medical History / Comment(s): heart problems-living at age 83





General Exam


Limitations: no limitations


General appearance: alert, in no apparent distress


Head exam: Present: atraumatic, normocephalic


Eye exam: Present: normal appearance, EOMI


ENT exam: Present: other (Clotting inside the left naris without active 

bleeding noted; blood in the posterior oropharynx that appears clotted without 

active bleed.)


Respiratory exam: Absent: respiratory distress


Cardiovascular Exam: Present: regular rate, normal rhythm


Rectal exam: Present: deferred





Course


 Vital Signs











  04/02/18 04/02/18





  19:12 21:49


 


Temperature 97.7 F 98.2 F


 


Pulse Rate 77 63


 


Respiratory 16 18





Rate  


 


Blood Pressure 143/83 157/98


 


O2 Sat by Pulse 97 96





Oximetry  














Medical Decision Making





- Medical Decision Making





57-year-old  male presenting for evaluation of epistaxis status post 

operative repair today by Dr. Angulo.  On physical examination he has mild 

oozing from the left malar however is only intermittently present.  He does 

have blood in the posterior oropharynx however there is no active bleeding.  

Reading through the operative note by Dr. Angulo he had advised not to take 

any nasal sprays or other interventions.  Discussed the case with the on-call 

ENT doctor Emily who recommended that the patient be given Afrin.  Upon 

reevaluation the patient stated that he was having no further bleeding.  He 

stated that he would like to avoid Afrin at this time however that he had some 

at home and that he would take it if he should start having symptoms again.  He 

was further advised to follow-up with his ENT Dr. Angulo tomorrow and also 

given return instructions to the ED.  He acknowledged an understanding of all 

information provided and agreed to this plan of care.





Disposition


Clinical Impression: 


 Epistaxis





Disposition: HOME SELF-CARE


Condition: Stable


Instructions:  Nosebleed (ED)


Additional Instructions: 


As stated during our interview at bedside, if your bleeding should restart 

please use the Afrin in your left nose, using 2 sprays and then placed a clamp 

on her nose.  This should stay in place for 15 minutes and then you can take it 

off.  Please do not blow your nose or use any other sprays.  Please call your 

ENT tomorrow for follow-up and to discuss your visit to the ER today.


Referrals: 


Tom Cid MD [Primary Care Provider] - 1-2 days


Time of Disposition: 21:44
PAIN